# Patient Record
Sex: FEMALE | Race: WHITE | NOT HISPANIC OR LATINO | Employment: OTHER | URBAN - METROPOLITAN AREA
[De-identification: names, ages, dates, MRNs, and addresses within clinical notes are randomized per-mention and may not be internally consistent; named-entity substitution may affect disease eponyms.]

---

## 2017-02-17 ENCOUNTER — HOSPITAL ENCOUNTER (EMERGENCY)
Facility: HOSPITAL | Age: 67
Discharge: HOME | End: 2017-02-18
Attending: EMERGENCY MEDICINE | Admitting: EMERGENCY MEDICINE
Payer: COMMERCIAL

## 2017-02-17 ENCOUNTER — APPOINTMENT (EMERGENCY)
Dept: RADIOLOGY | Facility: HOSPITAL | Age: 67
End: 2017-02-17
Payer: COMMERCIAL

## 2017-02-17 DIAGNOSIS — R55 SYNCOPE: Primary | ICD-10-CM

## 2017-02-17 LAB
ANION GAP SERPL CALCULATED.3IONS-SCNC: 7 MMOL/L (ref 4–13)
BASOPHILS # BLD AUTO: 0.03 THOUSANDS/ΜL (ref 0–0.1)
BASOPHILS NFR BLD AUTO: 0 % (ref 0–1)
BUN SERPL-MCNC: 14 MG/DL (ref 5–25)
CALCIUM SERPL-MCNC: 8.7 MG/DL (ref 8.3–10.1)
CHLORIDE SERPL-SCNC: 107 MMOL/L (ref 100–108)
CO2 SERPL-SCNC: 27 MMOL/L (ref 21–32)
CREAT SERPL-MCNC: 0.73 MG/DL (ref 0.6–1.3)
EOSINOPHIL # BLD AUTO: 0.03 THOUSAND/ΜL (ref 0–0.61)
EOSINOPHIL NFR BLD AUTO: 0 % (ref 0–6)
ERYTHROCYTE [DISTWIDTH] IN BLOOD BY AUTOMATED COUNT: 12.9 % (ref 11.6–15.1)
GFR SERPL CREATININE-BSD FRML MDRD: >60 ML/MIN/1.73SQ M
GLUCOSE SERPL-MCNC: 128 MG/DL (ref 65–140)
HCT VFR BLD AUTO: 39.4 % (ref 34.8–46.1)
HGB BLD-MCNC: 13.5 G/DL (ref 11.5–15.4)
LYMPHOCYTES # BLD AUTO: 1.22 THOUSANDS/ΜL (ref 0.6–4.47)
LYMPHOCYTES NFR BLD AUTO: 16 % (ref 14–44)
MCH RBC QN AUTO: 32.1 PG (ref 26.8–34.3)
MCHC RBC AUTO-ENTMCNC: 34.3 G/DL (ref 31.4–37.4)
MCV RBC AUTO: 94 FL (ref 82–98)
MONOCYTES # BLD AUTO: 0.49 THOUSAND/ΜL (ref 0.17–1.22)
MONOCYTES NFR BLD AUTO: 6 % (ref 4–12)
NEUTROPHILS # BLD AUTO: 5.99 THOUSANDS/ΜL (ref 1.85–7.62)
NEUTS SEG NFR BLD AUTO: 78 % (ref 43–75)
NRBC BLD AUTO-RTO: 0 /100 WBCS
PLATELET # BLD AUTO: 208 THOUSANDS/UL (ref 149–390)
PMV BLD AUTO: 9.6 FL (ref 8.9–12.7)
POTASSIUM SERPL-SCNC: 3.8 MMOL/L (ref 3.5–5.3)
RBC # BLD AUTO: 4.2 MILLION/UL (ref 3.81–5.12)
SODIUM SERPL-SCNC: 141 MMOL/L (ref 136–145)
SPECIMEN SOURCE: NORMAL
TROPONIN I BLD-MCNC: 0.01 NG/ML (ref 0–0.08)
WBC # BLD AUTO: 7.78 THOUSAND/UL (ref 4.31–10.16)

## 2017-02-17 PROCEDURE — 85025 COMPLETE CBC W/AUTO DIFF WBC: CPT | Performed by: EMERGENCY MEDICINE

## 2017-02-17 PROCEDURE — 70450 CT HEAD/BRAIN W/O DYE: CPT

## 2017-02-17 PROCEDURE — 84484 ASSAY OF TROPONIN QUANT: CPT

## 2017-02-17 PROCEDURE — 36415 COLL VENOUS BLD VENIPUNCTURE: CPT | Performed by: EMERGENCY MEDICINE

## 2017-02-17 PROCEDURE — 80048 BASIC METABOLIC PNL TOTAL CA: CPT | Performed by: EMERGENCY MEDICINE

## 2017-02-17 PROCEDURE — 93005 ELECTROCARDIOGRAM TRACING: CPT

## 2017-02-17 PROCEDURE — 96360 HYDRATION IV INFUSION INIT: CPT

## 2017-02-17 RX ORDER — SERTRALINE HYDROCHLORIDE 25 MG/1
25 TABLET, FILM COATED ORAL
COMMUNITY

## 2017-02-17 RX ADMIN — SODIUM CHLORIDE 1000 ML: 0.9 INJECTION, SOLUTION INTRAVENOUS at 21:09

## 2017-02-18 VITALS
DIASTOLIC BLOOD PRESSURE: 54 MMHG | RESPIRATION RATE: 18 BRPM | SYSTOLIC BLOOD PRESSURE: 115 MMHG | OXYGEN SATURATION: 97 % | HEART RATE: 56 BPM | TEMPERATURE: 98 F | WEIGHT: 112 LBS

## 2017-02-18 LAB
ATRIAL RATE: 68 BPM
P AXIS: 74 DEGREES
PR INTERVAL: 224 MS
QRS AXIS: 85 DEGREES
QRSD INTERVAL: 88 MS
QT INTERVAL: 400 MS
QTC INTERVAL: 425 MS
SPECIMEN SOURCE: NORMAL
T WAVE AXIS: 49 DEGREES
TROPONIN I BLD-MCNC: 0 NG/ML (ref 0–0.08)
VENTRICULAR RATE: 68 BPM

## 2017-02-18 PROCEDURE — 99285 EMERGENCY DEPT VISIT HI MDM: CPT

## 2017-03-28 ENCOUNTER — ALLSCRIPTS OFFICE VISIT (OUTPATIENT)
Dept: OTHER | Facility: OTHER | Age: 67
End: 2017-03-28

## 2017-03-28 DIAGNOSIS — E78.5 HYPERLIPIDEMIA: ICD-10-CM

## 2017-05-02 ENCOUNTER — ALLSCRIPTS OFFICE VISIT (OUTPATIENT)
Dept: OTHER | Facility: OTHER | Age: 67
End: 2017-05-02

## 2017-05-22 ENCOUNTER — HOSPITAL ENCOUNTER (OUTPATIENT)
Dept: CT IMAGING | Facility: HOSPITAL | Age: 67
Discharge: HOME | End: 2017-05-22
Attending: INTERNAL MEDICINE
Payer: COMMERCIAL

## 2017-05-22 DIAGNOSIS — E78.5 HYPERLIPIDEMIA: ICD-10-CM

## 2017-05-25 ENCOUNTER — GENERIC CONVERSION - ENCOUNTER (OUTPATIENT)
Dept: OTHER | Facility: OTHER | Age: 67
End: 2017-05-25

## 2017-05-26 ENCOUNTER — ALLSCRIPTS OFFICE VISIT (OUTPATIENT)
Dept: OTHER | Facility: OTHER | Age: 67
End: 2017-05-26

## 2018-01-04 ENCOUNTER — ALLSCRIPTS OFFICE VISIT (OUTPATIENT)
Dept: OTHER | Facility: OTHER | Age: 68
End: 2018-01-04

## 2018-01-04 DIAGNOSIS — E78.5 HYPERLIPIDEMIA: ICD-10-CM

## 2018-01-04 DIAGNOSIS — Z11.59 ENCOUNTER FOR SCREENING FOR OTHER VIRAL DISEASES (CODE): ICD-10-CM

## 2018-01-13 VITALS
SYSTOLIC BLOOD PRESSURE: 92 MMHG | HEART RATE: 62 BPM | HEIGHT: 64 IN | DIASTOLIC BLOOD PRESSURE: 50 MMHG | WEIGHT: 112.19 LBS | BODY MASS INDEX: 19.15 KG/M2

## 2018-01-13 VITALS
HEART RATE: 64 BPM | BODY MASS INDEX: 19.12 KG/M2 | HEIGHT: 64 IN | DIASTOLIC BLOOD PRESSURE: 72 MMHG | WEIGHT: 112 LBS | SYSTOLIC BLOOD PRESSURE: 145 MMHG

## 2018-01-14 VITALS
SYSTOLIC BLOOD PRESSURE: 118 MMHG | WEIGHT: 113 LBS | HEIGHT: 64 IN | BODY MASS INDEX: 19.29 KG/M2 | HEART RATE: 61 BPM | DIASTOLIC BLOOD PRESSURE: 60 MMHG

## 2018-01-15 ENCOUNTER — GENERIC CONVERSION - ENCOUNTER (OUTPATIENT)
Dept: INTERNAL MEDICINE CLINIC | Facility: CLINIC | Age: 68
End: 2018-01-15

## 2018-01-22 VITALS
WEIGHT: 108 LBS | RESPIRATION RATE: 16 BRPM | OXYGEN SATURATION: 98 % | BODY MASS INDEX: 18.44 KG/M2 | DIASTOLIC BLOOD PRESSURE: 76 MMHG | HEIGHT: 64 IN | SYSTOLIC BLOOD PRESSURE: 110 MMHG | TEMPERATURE: 97.3 F | HEART RATE: 60 BPM

## 2018-01-22 VITALS
WEIGHT: 112 LBS | HEART RATE: 66 BPM | BODY MASS INDEX: 19.12 KG/M2 | HEIGHT: 64 IN | DIASTOLIC BLOOD PRESSURE: 68 MMHG | SYSTOLIC BLOOD PRESSURE: 110 MMHG

## 2018-01-23 NOTE — PROGRESS NOTES
Assessment    1  Encounter for preventive health examination (V70 0) (Z00 00)    Plan  Dyslipidemia    · (1) COMPREHENSIVE METABOLIC PANEL; Status:Active; Requested [id number];    · (1) LIPID PANEL FASTING W DIRECT LDL REFLEX; Status:Active; Requested  EBP:[id number];    · (1) TSH WITH FT4 REFLEX; Status:Active; Requested ADO:[id number];    · Follow-up visit in 6 months Evaluation and Treatment  Follow-up  Status: Hold For -  Scheduling  Requested for: 34SKJ3487  Health Maintenance    · Alcohol misuse can be a problem with advancing age ; Status:Complete;   Done:  84FFY7853   · Drink plenty of fluids ; Status:Complete;   Done: 59PLJ8480   · Eat a low fat and low cholesterol diet ; Status:Complete;   Done: 30GCN9146   · The plan of care for falls is detailed in the plan and/or discussion section of today's note ;  Status:Complete;   Done: 11KWJ4765   · There are many exercise options for seniors ; Status:Complete;   Done: 15QSL5914   · There ways to avoid falling ; Status:Complete;   Done: 90CHB7627   · These are things you can do to prevent falls in and around the home ; Status:Complete;    Done: 78GMB4616   · We recommend that you follow these steps to lower your risk of osteoporosis  ;  Status:Complete;   Done: 77FIH7899  Need for hepatitis C screening test    · (1) HEP C ANTIBODY; Status:Active; Requested [id number]:[id number];   Screening for genitourinary condition    · *VB - Urinary Incontinence Screen (Dx Z13 89 Screen for UI); Status:Active; Requested  JZV:[id number];   Screening for neurological condition    · *VB - Fall Risk Assessment  (Dx Z13 89 Screen for Neurologic Disorder); Status:Active; Requested ZJS:[id number]; Discussion/Summary    Fall screen - negative     screen- negative  Impression: Initial Annual Wellness Visit, with preventive exam as well as age and risk appropriate counseling completed       Cardiovascular screening and counseling: the risks and benefits of screening were discussed and screening is current  Diabetes screening and counseling: the risks and benefits of screening were discussed and due for blood glucose  Colorectal cancer screening and counseling: the risks and benefits of screening were discussed, screening is current and followed by Dr Sarah Sarkar  Breast cancer screening and counseling: the risks and benefits of screening were discussed and the patient declines screening  Cervical cancer screening and counseling: the risks and benefits of screening were discussed, screening is current and Followed by Dr Orion Ahumada  Osteoporosis screening and counseling: the risks and benefits of screening were discussed, screening is current and Followed by Dr Orion Ahumada  Abdominal aortic aneurysm screening and counseling: screening not indicated  Glaucoma screening and counseling: the risks and benefits of screening were discussed and ophthalmologist referral    HIV screening and counseling: screening not indicated  Hepatitis C Screening: the patient was counseled on Hepatitis C screening  The patient agrees to Hepatitis C screening  Immunizations: the risks and benefits of influenza vaccination were discussed with the patient, influenza vaccine is up to date this year, influenza vaccination is recommended annually, the risks and benefits of pneumococcal vaccination were discussed with the patient, the patient declines the pneumococcal vaccination, hepatitis B vaccination series is not indicated at this time due to the patient's low risk of bhumika the disease, Zostavax vaccination up to date, the risks and benefits of the Td vaccine were discussed with the patient, the patient declines the Td vaccine, the risks and benefits of the Tdap vaccine were discussed with the patient and the patient declines the Tdap vaccine  Advance Directive Planning: complete and up to date  Advice and education were given regarding alcohol use and fall risk reduction   She was referred to none today  Medical Equipment/Suppliers: none today  Patient Discussion: plan discussed with the patient, follow-up visit needed in 6 months, 55 minute visit, greater than half of the time was spent on counseling  The treatment plan was reviewed with the patient/guardian  The patient/guardian understands and agrees with the treatment plan      History of Present Illness  HPI: 74yo female here as a new patient  She had n/v/d over new years, last episode of diarrhea last night after eating some ice cream    Welcome to Medicare and Wellness Visits: The patient is being seen for the initial annual wellness visit  Medicare Screening and Risk Factors   Hospitalizations: no previous hospitalizations, she has been hospitalized 0 times and no hospitalizations over past 12 months  Once per lifetime medicare screening tests: not eligible  Medicare Screening Tests Risk Questions   Abdominal aortic aneurysm risk assessment: none indicated  Osteoporosis risk assessment: , female gender, over 48years of age and alcohol use  Drug and Alcohol Use: The patient has never smoked cigarettes  The patient reports frequent alcohol use, drinking 1 drinks per day and 1 shot of bourbon each night  Diet and Physical Activity: Current diet includes well balanced meals  She exercises daily  Exercise: walking, plays tennis and skiis  Mood Disorder and Cognitive Impairment Screening: PHQ-9 Depression Scale   Over the past 2 weeks, how often have you been bothered by the following problems? 1 ) Little interest or pleasure in doing things? Not at all    2 ) Feeling down, depressed or hopeless? Not at all    3 ) Trouble falling asleep or sleeping too much? Not at all    4 ) Feeling tired or having little energy? Not at all    5 ) Poor appetite or overeating? Not at all    6 ) Feeling bad about yourself, or that you are a failure, or have let yourself or your family down?  Not at all    7 ) Trouble concentrating on things, such as reading a newspaper or watching television? Not at all    8 ) Moving or speaking so slowly that other people could have noticed, or the opposite, moving or speaking faster than usual? Not at all    9 ) Thoughts that you would be off dead or of hurting yourself in some way? Not at all  TOTAL SCORE: 0    Functional Ability/Level of Safety: She denies hearing difficulties  The patient is currently able to do activities of daily living without limitations, able to do instrumental activities of daily living without limitations, able to participate in social activities without limitations and able to drive without limitations  Fall risk factors: The patient fell 1 times in the past 12 months  had syncope episode  Home safety risk factors:  no grab bars in the bathroom and lives with her , but no loose rugs and no household clutter  Advance Directives: Advance directives: living will, durable power of  for health care directives and advance directives  Co-Managers and Medical Equipment/Suppliers: See Patient Care Team   Preventive Quality Program 65 and Older: Falls Risk: The patient fell 1 times in the past 12 months  Patient Care Team    Care Team Member Role Specialty Office Number   Zay LINDSAY  Cardiology [phone] [address] [phone]   Tomy Bentley MD  Colon and Rectal Surgery [phone]     Review of Systems    Constitutional: recent weight loss  Respiratory: negative  Gastrointestinal: as noted in HPI  Genitourinary: negative  Neurological: negative  Active Problems    1   Dyslipidemia (272 4) (E78 5)    Past Medical History    · History of Cyst   · History of Epilepsy (345 90) (G40 909)   · History of Lyme disease (V12 09) (Z86 19)   · History of tear of meniscus of knee joint (V13 59) (U39 944)   · History of Pituitary microadenoma (227 3) (D35 2)   · History of Vasovagal syncope (780 2) (R55)    The active problems and past medical history were reviewed and updated today  Surgical History    The surgical history was reviewed and updated today  Family History  Mother    · Family history of breast cancer in female (V16 3) (Z80 2)  Father    · Family history of stroke (V17 1) (Z82 3)  Maternal Aunt    · Family history of malignant neoplasm of ovary (V16 41) (Z80 41)    The family history was reviewed and updated today  Social History    · Alcohol use (V49 89) (Z78 9)   ·    · Never a smoker   · Occupation   · , , educator  The social history was reviewed and is unchanged  Current Meds   1  Aspirin 81 MG Oral Tablet Delayed Release; TAKE 1 TABLET DAILY; Therapy: (Recorded:28Mar2017) to Recorded   2  Climara Pro 0 045-0 015 MG/DAY Transdermal Patch Weekly; Therapy: (Recorded:28Mar2017) to Recorded   3  [employer] Fish Oil CAPS; Therapy: (Recorded:02May2017) to Recorded   4  [employer] Vitamin D3 1000 UNIT CAPS; take 1 capsule daily; Therapy: (Recorded:28Mar2017) to Recorded   5  Zoloft 25 MG Oral Tablet; TAKE 1 TABLET DAILY; Therapy: (Recorded:28Mar2017) to Recorded    The medication list was reviewed and updated today  Allergies    1  Pravastatin Sodium TABS   2  Zetia TABS   3  Crestor    Immunizations   1    Influenza  Nov 2017    Zoster  2016     Vitals  Signs    Temperature: 97 3 F  Heart Rate: 60  Respiration: 16  Systolic: 525  Diastolic: 76  Height: 5 ft 4 in  Weight: 108 lb   BMI Calculated: 18 54  BSA Calculated: 1 51  O2 Saturation: 98    Physical Exam    Constitutional   General appearance: No acute distress, well appearing and well nourished  well developed  Head and Face   Head and face: Normal     Eyes   Conjunctiva and lids: No swelling, erythema or discharge  Ears, Nose, Mouth, and Throat   External inspection of ears and nose: Normal     Otoscopic examination: Tympanic membranes translucent with normal light reflex   Canals patent without erythema  Hearing: Normal     Nasal mucosa, septum, and turbinates: Normal without edema or erythema  Lips, teeth, and gums: Normal, good dentition  Oropharynx: Normal with no erythema, edema, exudate or lesions  Neck   Neck: Supple, symmetric, trachea midline, no masses  Thyroid: Normal, no thyromegaly  Pulmonary   Respiratory effort: No increased work of breathing or signs of respiratory distress  Auscultation of lungs: Clear to auscultation  Cardiovascular   Auscultation of heart: Normal rate and rhythm, normal S1 and S2, no murmurs  Carotid pulses: 2+ bilaterally  Pedal pulses: 2+ bilaterally  Examination of extremities for edema and/or varicosities: Normal     Chest pt refused  Abdomen   Abdomen: Non-tender, no masses  The abdomen was flat  Bowel sounds were normal  The abdomen was soft and nontender  The abdomen was normal to percussion  Lymphatic   Palpation of lymph nodes in neck: No lymphadenopathy  Musculoskeletal   Gait and station: Normal     Neurologic No focal deficit  Cortical function: Normal mental status  There is no cognitive impairment  The patient achieved a score of 30 / 30 on the MMSE  Level of consciousness: normal    Psychiatric   Orientation to person, place, and time: Normal     Mood and affect: Normal        Procedure    Procedure: Visual Acuity Test    Follow-up  n/a for AWV  The patient was referred to Opthomology        Signatures   Electronically signed by : Monica Bojorquez DO; Jan 4 2018 12:12PM EST                       (Author)

## 2018-06-22 LAB — HCV AB SER-ACNC: NEGATIVE

## 2018-06-26 ENCOUNTER — TELEPHONE (OUTPATIENT)
Dept: INTERNAL MEDICINE CLINIC | Facility: CLINIC | Age: 68
End: 2018-06-26

## 2018-06-26 NOTE — TELEPHONE ENCOUNTER
Pt wanted to inform you that she had gotten her labs done on 6/22 at [facility]  They are in the Care Everywhere tab

## 2018-08-14 ENCOUNTER — OFFICE VISIT (OUTPATIENT)
Dept: INTERNAL MEDICINE CLINIC | Facility: CLINIC | Age: 68
End: 2018-08-14
Payer: COMMERCIAL

## 2018-08-14 VITALS
HEART RATE: 55 BPM | RESPIRATION RATE: 16 BRPM | OXYGEN SATURATION: 98 % | SYSTOLIC BLOOD PRESSURE: 116 MMHG | WEIGHT: 106.6 LBS | TEMPERATURE: 97.1 F | HEIGHT: 64 IN | DIASTOLIC BLOOD PRESSURE: 80 MMHG | BODY MASS INDEX: 18.2 KG/M2

## 2018-08-14 DIAGNOSIS — N95.1 SYMPTOMATIC MENOPAUSAL OR FEMALE CLIMACTERIC STATES: ICD-10-CM

## 2018-08-14 DIAGNOSIS — E78.5 DYSLIPIDEMIA: Primary | ICD-10-CM

## 2018-08-14 PROBLEM — R55 VASOVAGAL SYNCOPE: Status: ACTIVE | Noted: 2017-03-28

## 2018-08-14 PROBLEM — F32.9 MDD (MAJOR DEPRESSIVE DISORDER): Status: RESOLVED | Noted: 2018-01-04 | Resolved: 2018-08-14

## 2018-08-14 PROBLEM — F32.9 MDD (MAJOR DEPRESSIVE DISORDER): Status: ACTIVE | Noted: 2018-01-04

## 2018-08-14 PROCEDURE — 99213 OFFICE O/P EST LOW 20 MIN: CPT | Performed by: INTERNAL MEDICINE

## 2018-08-14 PROCEDURE — 3008F BODY MASS INDEX DOCD: CPT | Performed by: INTERNAL MEDICINE

## 2018-08-14 PROCEDURE — 3725F SCREEN DEPRESSION PERFORMED: CPT | Performed by: INTERNAL MEDICINE

## 2018-08-14 RX ORDER — CHLORHEXIDINE/GLYCERIN/HE-CELL
JELLY (GRAM) TOPICAL DAILY
COMMUNITY

## 2018-08-14 NOTE — PROGRESS NOTES
Assessment/Plan:    Dyslipidemia  Improved through dietary modifications  Continue to monitor    Symptomatic menopausal or female climacteric states  Has been on chronic use of sertraline 25mg daily per GYN  1  Dyslipidemia     2  Symptomatic menopausal or female climacteric states         Subjective:      Patient ID: Portia Mc is a 78 y o  female  72yo female with dyslipidemia and MDD here follow up care  Hyperlipidemia   This is a chronic problem  The current episode started more than 1 year ago  The problem is controlled  Current antihyperlipidemic treatment includes diet change  The current treatment provides moderate improvement of lipids  She was started on sertraline 25mg after having PMS symptoms after treatment of microadenoma  She is now postmenopausal, gets hot flashes  The following portions of the patient's history were reviewed and updated as appropriate: allergies, current medications, past family history, past medical history, past social history, past surgical history and problem list     Current Outpatient Prescriptions:     aspirin 81 MG tablet, Take 1 tablet by mouth daily, Disp: , Rfl:     Cholecalciferol ([employer]S VITAMIN D3) 1000 units capsule, Take 1 capsule by mouth daily, Disp: , Rfl:     Omega-3 Fatty Acids ([employer] FISH OIL) 1000 MG CAPS, Take by mouth, Disp: , Rfl:     sertraline (ZOLOFT) 25 mg tablet, Take 25 mg by mouth daily, Disp: , Rfl:     Review of Systems   Constitutional: Positive for unexpected weight change  Respiratory: Negative  Cardiovascular: Negative  Gastrointestinal: Negative  Genitourinary: Negative  Neurological: Negative  Psychiatric/Behavioral: Negative            Objective:    /80 (BP Location: Left arm, Patient Position: Sitting)   Pulse 55   Temp (!) 97 1 °F (36 2 °C)   Resp 16   Ht 5' 4" (1 626 m)   Wt 48 4 kg (106 lb 9 6 oz)   SpO2 98%   BMI 18 30 kg/m²      Physical Exam   Constitutional: She is oriented to person, place, and time  She appears well-developed and well-nourished  No distress  HENT:   Mouth/Throat: Oropharynx is clear and moist    Neck: No thyromegaly present  Cardiovascular: Normal rate, regular rhythm and normal heart sounds  Pulmonary/Chest: Effort normal and breath sounds normal  No respiratory distress  She has no wheezes  Neurological: She is alert and oriented to person, place, and time  Psychiatric: She has a normal mood and affect  Vitals reviewed        6/22/18  Fasting glucose 98, BUN/cr 18/0 83  Hep C neg  Total cholesterol 190, triglycerides 44, HDL 77 and   TSH 2 01  PHQ-9 Depression Screening    PHQ-9:    Frequency of the following problems over the past two weeks:       Little interest or pleasure in doing things:  0 - not at all  Feeling down, depressed, or hopeless:  0 - not at all  PHQ-2 Score:  0

## 2018-09-19 RX ORDER — PRAVASTATIN SODIUM 20 MG
TABLET ORAL DAILY
COMMUNITY
End: 2018-09-26 | Stop reason: ALTCHOICE

## 2018-09-19 RX ORDER — ASPIRIN 81 MG/1
TABLET ORAL DAILY
COMMUNITY
End: 2018-10-16 | Stop reason: ALTCHOICE

## 2018-09-26 ENCOUNTER — OFFICE VISIT (OUTPATIENT)
Dept: CARDIOLOGY CLINIC | Facility: CLINIC | Age: 68
End: 2018-09-26
Payer: COMMERCIAL

## 2018-09-26 VITALS
OXYGEN SATURATION: 99 % | BODY MASS INDEX: 17.86 KG/M2 | WEIGHT: 107.2 LBS | SYSTOLIC BLOOD PRESSURE: 102 MMHG | HEART RATE: 75 BPM | HEIGHT: 65 IN | DIASTOLIC BLOOD PRESSURE: 56 MMHG

## 2018-09-26 DIAGNOSIS — E78.5 DYSLIPIDEMIA: Primary | ICD-10-CM

## 2018-09-26 PROCEDURE — 99214 OFFICE O/P EST MOD 30 MIN: CPT | Performed by: INTERNAL MEDICINE

## 2018-09-26 NOTE — PROGRESS NOTES
Cardiology   Alina Smith 80 y o  female MRN: [id number]        Impression:  1  Syncope - very suggestive of vasovagal  Has improved with hydration  2  Dyslipidemia - CT Calcium score of 42  But improved LDL on diet modifications  Recommendations:  1  Continue current medications  2  Follow up on an as needed basis  HPI: Alina Smith is a 80y o  year old female with likely vasovagal syncope  No further episodes with hydration  Is reluctant to start a statin, but cholesterol much improved with diet and exercise  No chest pain, shortness of breath, or palpitations  Review of Systems   Constitutional: Negative  HENT: Negative  Eyes: Negative  Respiratory: Negative for chest tightness and shortness of breath  Cardiovascular: Negative for chest pain, palpitations and leg swelling  Gastrointestinal: Negative  Endocrine: Negative  Genitourinary: Negative  Musculoskeletal: Negative  Skin: Negative  Allergic/Immunologic: Negative  Neurological: Negative  Hematological: Negative  Psychiatric/Behavioral: Negative  All other systems reviewed and are negative  Past Medical History:   Diagnosis Date    Epilepsy ([facility] )     Lyme disease     Microadenoma ([facility] )      No past surgical history on file  History   Alcohol Use    Yes     History   Drug Use No     History   Smoking Status    Never Smoker   Smokeless Tobacco    Never Used     Family History   Problem Relation Age of Onset    Stroke Father     Ovarian cancer Maternal Aunt        Allergies:   Allergies   Allergen Reactions    Other     Penicillins     Rosuvastatin     Ezetimibe Rash    Pravastatin Rash       Medications:     Current Outpatient Prescriptions:     aspirin (ECOTRIN LOW STRENGTH) 81 mg EC tablet, Daily, Disp: , Rfl:     Omega-3 Fatty Acids ([employer]S FISH OIL) 1000 MG CAPS, Take by mouth, Disp: , Rfl:     sertraline (ZOLOFT) 25 mg tablet, Take 25 mg by mouth daily, Disp: , Rfl:       Wt Readings from Last 3 Encounters:   09/26/18 48 6 kg (107 lb 3 2 oz)   08/14/18 48 4 kg (106 lb 9 6 oz)   01/04/18 49 kg (108 lb)     Temp Readings from Last 3 Encounters:   08/14/18 (!) 97 1 °F (36 2 °C)   01/04/18 (!) 97 3 °F (36 3 °C)   02/17/17 98 °F (36 7 °C) (Oral)     BP Readings from Last 3 Encounters:   09/26/18 102/56   08/14/18 116/80   01/04/18 110/76     Pulse Readings from Last 3 Encounters:   09/26/18 75   08/14/18 55   01/04/18 60         Physical Exam   Constitutional: She is oriented to person, place, and time  She appears well-developed  HENT:   Head: Atraumatic  Eyes: EOM are normal    Neck: Normal range of motion  Cardiovascular: Normal rate, regular rhythm and normal heart sounds  Exam reveals no gallop and no friction rub  No murmur heard  Pulmonary/Chest: Effort normal and breath sounds normal  No respiratory distress  She has no wheezes  She has no rales  Abdominal: Soft  Musculoskeletal: Normal range of motion  Neurological: She is alert and oriented to person, place, and time  Skin: Skin is warm and dry  Psychiatric: She has a normal mood and affect           Laboratory Studies:  CMP:  Lab Results   Component Value Date     02/17/2017    K 3 8 02/17/2017     02/17/2017    CO2 27 02/17/2017    BUN 14 02/17/2017    CREATININE 0 73 02/17/2017    EGFR >60 0 02/17/2017

## 2018-10-16 ENCOUNTER — APPOINTMENT (OUTPATIENT)
Dept: RADIOLOGY | Facility: OTHER | Age: 68
End: 2018-10-16
Payer: COMMERCIAL

## 2018-10-16 ENCOUNTER — OFFICE VISIT (OUTPATIENT)
Dept: OBGYN CLINIC | Facility: OTHER | Age: 68
End: 2018-10-16
Payer: COMMERCIAL

## 2018-10-16 VITALS
HEIGHT: 65 IN | SYSTOLIC BLOOD PRESSURE: 114 MMHG | WEIGHT: 110 LBS | HEART RATE: 56 BPM | BODY MASS INDEX: 18.33 KG/M2 | DIASTOLIC BLOOD PRESSURE: 74 MMHG

## 2018-10-16 DIAGNOSIS — S46.011A ROTATOR CUFF STRAIN, RIGHT, INITIAL ENCOUNTER: Primary | ICD-10-CM

## 2018-10-16 DIAGNOSIS — M25.511 ACUTE PAIN OF RIGHT SHOULDER: ICD-10-CM

## 2018-10-16 PROCEDURE — 73030 X-RAY EXAM OF SHOULDER: CPT

## 2018-10-16 PROCEDURE — 99203 OFFICE O/P NEW LOW 30 MIN: CPT | Performed by: ORTHOPAEDIC SURGERY

## 2018-10-16 RX ORDER — A/SINGAPORE/GP1908/2015 IVR-180 (H1N1) (AN A/MICHIGAN/45/2015 (H1N1)PDM09-LIKE VIRUS), A/HONG KONG/4801/2014, NYMC X-263B (H3N2) (AN A/HONG KONG/4801/2014-LIKE VIRUS), AND B/BRISBANE/60/2008, WILD TYPE (A B/BRISBANE/60/2008-LIKE VIRUS) 15; 15; 15 UG/.5ML; UG/.5ML; UG/.5ML
INJECTION, SUSPENSION INTRAMUSCULAR
Refills: 0 | COMMUNITY
Start: 2018-10-08 | End: 2018-12-10 | Stop reason: CLARIF

## 2018-10-16 NOTE — PROGRESS NOTES
Assessment  Diagnoses and all orders for this visit:    Acute RTC strain right shoulder      Discussion and Plan:  Patient exam today is consistent with a right shoulder RTC strain  Discussed treatment options with the patient today  Explained to the patient that the shoulder will continue to improve on it's own  If symptoms fail to improve we can consider injection at a later date  Recommend a course of PT  Continue ice as needed  IBU OTC prn pain   Activities as tolerated    Patient will follow up as needed      Subjective:   Patient ID: Milagro Nichols is a 80 y o  female      HPI  This a 80 yr old female here today for initial evaluation of right shoulder pain  Patient had prior open RTC repair by Dr Shari Berry in 2009 followed by 2 MUGA and surgery to remove scar tissue  Patient had been doing well until about 2 wks ago  Patient thinks she may have done something in her exercise class  Today she complains of anterior and lateral shoulder pain  Pain does not cross the elbow joint  Patient denies any N/T in the RUE  She has been icing the shoulder and taking  Patient states the right shoulder feels weak  Patient does not have pain with ROM of the right shoulder  The following portions of the patient's history were reviewed and updated as appropriate: allergies, current medications, past family history, past medical history, past social history, past surgical history and problem list     Review of Systems   Constitutional: Negative for chills and fever  HENT: Negative for drooling and sneezing  Eyes: Negative for redness  Respiratory: Negative for cough and wheezing  Gastrointestinal: Negative for nausea and vomiting  Psychiatric/Behavioral: Negative for behavioral problems  The patient is not nervous/anxious  Objective:  Right Shoulder Exam     Tenderness   The patient is experiencing tenderness in the [facility], [place] joint, anterolateral cuff insertion, anterior deltoid      Range of Motion   Normal right shoulder ROM    Muscle Strength   Normal right shoulder strength    Tests   Impingement:   Negative  Truong:          Negative  Cross Arm:      Negative  Drop Arm:        Negative          Physical Exam   Constitutional: She is oriented to person, place, and time  She appears well-developed and well-nourished  Eyes: Pupils are equal, round, and reactive to light  Pulmonary/Chest: Effort normal and breath sounds normal    Neurological: She is alert and oriented to person, place, and time  Skin: Skin is warm and dry  Psychiatric: She has a normal mood and affect  Her behavior is normal  Judgment and thought content normal          I have personally reviewed pertinent films in PACS and my interpretation is as follows  Right shoulder x-rays:  No fracture or dislocation,  Evidence of prior surgery  Scribe Attestation    I,:   Antonio Haddad am acting as a scribe while in the presence of the attending physician :        I,:   Ike Martinez MD personally performed the services described in this documentation    as scribed in my presence :             Claudene Livers MD, personally performed the services described in this documentation  All medical record entries made by the scribe were at my direction and in my presents  I have reviewed the chart and discharge instructions ( if applicable) and agree that the record reflects my personal performance and is accurate and complete Bro Valentin MD     Patient has a resolving rotator cuff strain and is functioning quite well, I did recommend she least visit the physical therapist to learn some exercises and help with local modalities to improve some of her symptoms   Overall structurally her shoulder seems quite sound and I do not feel further evaluation is warranted, if her symptoms worsen or she stops making progress I would be happy to re-evaluate her

## 2018-11-12 ENCOUNTER — OFFICE VISIT (OUTPATIENT)
Dept: INTERNAL MEDICINE CLINIC | Facility: CLINIC | Age: 68
End: 2018-11-12
Payer: COMMERCIAL

## 2018-11-12 VITALS
HEIGHT: 65 IN | OXYGEN SATURATION: 98 % | HEART RATE: 65 BPM | TEMPERATURE: 99.1 F | SYSTOLIC BLOOD PRESSURE: 115 MMHG | WEIGHT: 109.6 LBS | DIASTOLIC BLOOD PRESSURE: 68 MMHG | BODY MASS INDEX: 18.26 KG/M2

## 2018-11-12 DIAGNOSIS — L03.011 PARONYCHIA OF FINGER OF RIGHT HAND: ICD-10-CM
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
## 2018-11-12 DIAGNOSIS — M85.80 OSTEOPENIA, UNSPECIFIED LOCATION: Primary | ICD-10-CM

## 2018-11-12 PROBLEM — M85.89 OSTEOPENIA OF MULTIPLE SITES: Status: ACTIVE | Noted: 2018-11-12

## 2018-11-12 PROBLEM — R55 VASOVAGAL SYNCOPE: Status: RESOLVED | Noted: 2017-03-28 | Resolved: 2018-11-12

## 2018-11-12 PROBLEM — E78.5 DYSLIPIDEMIA: Status: RESOLVED | Noted: 2017-03-28 | Resolved: 2018-11-12

## 2018-11-12 PROBLEM — S46.011A ROTATOR CUFF STRAIN, RIGHT, INITIAL ENCOUNTER: Status: RESOLVED | Noted: 2018-10-16 | Resolved: 2018-11-12

## 2018-11-12 PROCEDURE — 3008F BODY MASS INDEX DOCD: CPT | Performed by: INTERNAL MEDICINE

## 2018-11-12 PROCEDURE — 1036F TOBACCO NON-USER: CPT | Performed by: INTERNAL MEDICINE

## 2018-11-12 PROCEDURE — 99214 OFFICE O/P EST MOD 30 MIN: CPT | Performed by: INTERNAL MEDICINE

## 2018-11-12 PROCEDURE — 1160F RVW MEDS BY RX/DR IN RCRD: CPT | Performed by: INTERNAL MEDICINE

## 2018-11-14 ENCOUNTER — TELEPHONE (OUTPATIENT)
Dept: INTERNAL MEDICINE CLINIC | Facility: CLINIC | Age: 68
End: 2018-11-14

## 2018-11-14 DIAGNOSIS — L03.011 PARONYCHIA OF RIGHT RING FINGER: Primary | ICD-10-CM

## 2018-11-14 NOTE — TELEPHONE ENCOUNTER
Patient was seen by you on Monday, 11/12/18  At her visit she discussed her infected finger  She called today and mentioned that you said if it wasn't any better, to let you know  She said she thinks it has gotten worse  She wanted to schedule an appointment with you on Friday, so I did schedule her  Are you ok with this? She didn't want to come out tomorrow due to the weather coming  Thanks

## 2018-11-14 NOTE — TELEPHONE ENCOUNTER
I called [facility] and they are able to get her in tomorrow morning at 8:40 with Dr Dallas Lafleur  I called [facility] Taylor and explained that you recommended for her to see the hand surgeon  She was agreeable and is willing to go

## 2018-11-14 NOTE — TELEPHONE ENCOUNTER
I think the next step is to see a hand surgeon about possibly exploring and draining the inflamed area (I mentioned this to [facility] as the next step)  Please set up an asap appointment with [facility] hand surgery (Amberly Lyles and Claudette Persaud) regarding right 4th finger paronychia with possible foreign body, with no response to 2 courses of oral antibiotics and mupirocin  Thanks

## 2018-11-30 ENCOUNTER — TELEPHONE (OUTPATIENT)
Dept: INTERNAL MEDICINE CLINIC | Facility: CLINIC | Age: 68
End: 2018-11-30

## 2018-11-30 NOTE — TELEPHONE ENCOUNTER
----- Message from Philip Samuels MD sent at 11/30/2018  2:18 PM EST -----  Please call Deann:  Her vitamin-D level is normal but is borderline low at 31    I would like her to take an extra 1000 units a day of vitamin D3

## 2018-12-10 ENCOUNTER — OFFICE VISIT (OUTPATIENT)
Dept: INTERNAL MEDICINE CLINIC | Facility: CLINIC | Age: 68
End: 2018-12-10
Payer: COMMERCIAL

## 2018-12-10 VITALS
WEIGHT: 109 LBS | DIASTOLIC BLOOD PRESSURE: 62 MMHG | TEMPERATURE: 98.5 F | HEIGHT: 65 IN | SYSTOLIC BLOOD PRESSURE: 115 MMHG | HEART RATE: 54 BPM | BODY MASS INDEX: 18.16 KG/M2 | OXYGEN SATURATION: 99 %

## 2018-12-10 DIAGNOSIS — K59.9 COLONIC DYSMOTILITY: Primary | ICD-10-CM

## 2018-12-10 PROCEDURE — 3008F BODY MASS INDEX DOCD: CPT | Performed by: INTERNAL MEDICINE

## 2018-12-10 PROCEDURE — 1036F TOBACCO NON-USER: CPT | Performed by: INTERNAL MEDICINE

## 2018-12-10 PROCEDURE — 1160F RVW MEDS BY RX/DR IN RCRD: CPT | Performed by: INTERNAL MEDICINE

## 2018-12-10 PROCEDURE — 4040F PNEUMOC VAC/ADMIN/RCVD: CPT | Performed by: INTERNAL MEDICINE

## 2018-12-10 PROCEDURE — 99213 OFFICE O/P EST LOW 20 MIN: CPT | Performed by: INTERNAL MEDICINE

## 2018-12-10 RX ORDER — MELATONIN
2000 DAILY
COMMUNITY

## 2018-12-10 NOTE — PROGRESS NOTES
Assessment/Plan   Problem List Items Addressed This Visit     None      Visit Diagnoses     Colonic dysmotility    -  Primary      Plan is a trial [place] yogurt with probiotics, with each meal for 7 days, and set a probiotic capsules which Evens Whalen would rather avoid  We specifically discussed to call if her discomfort becomes worse, if there is any fever, chills, loss of appetite, change in bowel habits  Subjective   Patient ID: Maxi Guerrero is a 80 y o  female, here today regarding low-grade left lower abdominal discomfort since she started antibiotics in mid November  Vitals:    12/10/18 1125   BP: 115/62   Pulse: (!) 54   Temp: 98 5 °F (36 9 °C)   SpO2: 99%     HPI   Deann reports that she has had a low grade, 1 to 2/10 intensity sharp pain in the left lower quadrant since she started antibiotics in mid November  She initially had some slight loose bowel movements but this resolved after stopping antibiotics  There has been no fever or chills, and there has been no passage of mucus or blood  Symptoms are gone which she wakes up in the morning and never wake her up at night  There is a slight increase in symptoms after eating, and the symptoms are resolved by moving her bowels once a day  There is no history of diverticulitis  There is no history of previous abdominal or gyn surgery  There is no anorexia, weight loss, no gyn or urinary symptoms on review  So far, Evens Whalen has not tried anything for her symptoms  The following portions of the patient's history were reviewed and updated as appropriate: allergies, current medications, past family history, past medical history, past social history, past surgical history and problem list     Review of Systems no melena or blood per rectum  Objective   Physical Exam   Vital signs stable, appears healthy and in no distress  Skin well hydrated    Abdomen:  Nondistended without surgical scars in the patient points to the left lower quadrant region as the area of her symptoms  Her estradiol patch is in place in the left lower quadrant region  The abdomen is without hernia or periumbilical adenopathy, nontender including in the left lower quadrant region and elsewhere, no guarding or rebound and no mass organomegaly are appreciated  Skin without pallor or icterus        Patient Active Problem List   Diagnosis    Symptomatic menopausal or female climacteric states    Osteopenia of multiple sites     Current Outpatient Prescriptions:     cholecalciferol (VITAMIN D3) 1,000 units tablet, Take 2,000 Units by mouth daily, Disp: , Rfl:     estradiol-norethindrone (COMBIPATCH) 0 05-0 14 MG/DAY, Place 1 patch on the skin 2 (two) times a week, Disp: , Rfl:     Omega-3 Fatty Acids ([employer] FISH OIL) 1000 MG CAPS, Take by mouth, Disp: , Rfl:     sertraline (ZOLOFT) 25 mg tablet, Take 25 mg by mouth daily, Disp: , Rfl:     mupirocin (BACTROBAN) 2 % ointment, Apply topically 2 (two) times a day (Patient not taking: Reported on 12/10/2018 ), Disp: 22 g, Rfl: 0

## 2019-03-04 ENCOUNTER — TELEPHONE (OUTPATIENT)
Dept: INTERNAL MEDICINE CLINIC | Facility: CLINIC | Age: 69
End: 2019-03-04

## 2019-03-04 ENCOUNTER — PATIENT MESSAGE (OUTPATIENT)
Dept: INTERNAL MEDICINE CLINIC | Facility: CLINIC | Age: 69
End: 2019-03-04

## 2019-03-04 DIAGNOSIS — K92.9 DIGESTIVE PROBLEMS: Primary | ICD-10-CM

## 2019-03-04 NOTE — TELEPHONE ENCOUNTER
Can patient take the probiotic twice daily?  Referral was sent to gastro for an appointment request

## 2019-03-04 NOTE — TELEPHONE ENCOUNTER
From: Alma Majano  To: Renaldo Celeste MD  Sent: 3/4/2019 12:01 PM EST  Subject: Referral Request    Hi Dr Kianna Sweeney here  As you may recall, I had some pain on the lower left side of abdomen  We decided it was a consequence of antibiotics  You suggested yogurt, but had to turn to a bottle of probiotics that made the pain somewhat better   at least I wasn't getting excruciating gas pain anymore  The pain recurs just before noon on a daily basis  Sometimes I gag  Yesterday I gagged about 15 times  My throat hurts! At any rate, I wonder if I should take two probiotic pills/day or see a GI doc  As an important aside, our son is an insatiable traveler and picked up a bug in either [place] or [place] ( unsure which country ) For a few years now he has had irritable bowel syndrome  IBS  He had been seeing a GI for three years or so  The GI just hit a wall and said he didn't know what else to do for him  My son, Matt Douglass (yes, with an "s"), had followed the FODMAPS diet meticulously  After visiting a nutritionist, she told him to try L glutamine  His IBS has improved dramatically  He is now able to reintroduce all the "verboten" foods gradually  Matt Douglass is a deliberate thinker and doesn't generally take risks  I am pleasantly surprised about the results fo L glutamine  My questions: Would you prefer I see a GI doc and if so, whom? Do YOU want to see me in the office and discuss? Should I try two probiotics a day? Currently I am taking Ultimate Mary Lou, 25 billion , 10 probiotic strains, distributed by [employer]  At the time of this email, it is just before noon  For the first time I have not eaten the multigrain bread from [employer] for breakfast and have no pain  First time  I did a bit of research and it seems that wheat is a big irritant to stomach after antibiotic damage  Finally, I would like to try L glutamine  But I would like to consider your opinion before I do that      I am more than happy to come to your office  I wasn't sure how to designate the heading of this email  I am not sure I need a referral, but I certainly will go to a specialist if you think I should  I do know that I will never take antibiotics again without simultaneously taking probiotics  But I always learn the hard way      Best-Deann Lopez

## 2019-03-05 ENCOUNTER — TELEPHONE (OUTPATIENT)
Dept: INTERNAL MEDICINE CLINIC | Facility: CLINIC | Age: 69
End: 2019-03-05

## 2019-03-05 NOTE — TELEPHONE ENCOUNTER
I spoke with [facility] and I did Call [facility]  I was able to get her in on 3/14/19 at 10:30 with Dr Caro Loyd  She is aware and thankful for this appt

## 2019-03-14 ENCOUNTER — CONSULT (OUTPATIENT)
Dept: GASTROENTEROLOGY | Facility: AMBULARY SURGERY CENTER | Age: 69
End: 2019-03-14
Payer: COMMERCIAL

## 2019-03-14 VITALS
BODY MASS INDEX: 18.03 KG/M2 | HEART RATE: 56 BPM | TEMPERATURE: 97.3 F | HEIGHT: 65 IN | SYSTOLIC BLOOD PRESSURE: 108 MMHG | RESPIRATION RATE: 18 BRPM | WEIGHT: 108.2 LBS | DIASTOLIC BLOOD PRESSURE: 60 MMHG

## 2019-03-14 DIAGNOSIS — K92.9 DIGESTIVE PROBLEMS: ICD-10-CM

## 2019-03-14 DIAGNOSIS — R10.32 LEFT LOWER QUADRANT PAIN: Primary | ICD-10-CM

## 2019-03-14 DIAGNOSIS — R14.0 BLOATING: ICD-10-CM

## 2019-03-14 PROCEDURE — 99204 OFFICE O/P NEW MOD 45 MIN: CPT | Performed by: INTERNAL MEDICINE

## 2019-03-14 NOTE — PATIENT INSTRUCTIONS
Patient is scheduled for an u/s on 3/22/19 at 830am at [facility]

## 2019-03-14 NOTE — LETTER
March 14, 2019     Taty Son MD  [id number]     Patient: Nathaniel Schuster   YOB: 1950   Date of Visit: 3/14/2019       Dear Dr Jenkins Sat: Thank you for referring Darylene Harsh to me for evaluation  Below are my notes for this consultation  If you have questions, please do not hesitate to call me  I look forward to following your patient along with you  Sincerely,        Moe Shields MD        CC: No Recipients  Moe Shields MD  3/14/2019  2:21 PM  Sign at close encounter  Consultation - [facility]  Nathaniel Schuster 80 y o  female MRN: [id number]  Unit/Bed#:  Encounter: [id number]        Consults    ASSESSMENT/PLAN:   1  Abdominal bloating/gas/left lower quadrant pain-suspect symptoms likely secondary to post infectious IBS versus less likely diverticulitis or SCAD  -discussed continue probiotic   -patient has tried gluten free diet with some improvement, has tried low FODMAP diet with some improvement and is currently doing Yeast free diet-which she states the seems to be helping the most   -discussed obtaining CT of abdomen and pelvis to evaluate for etiology of pain however patient is concerned about radiation and would prefer ultrasound 1st   -if ultrasound is unremarkable, would recommend colonoscopy for further evaluation  -increase water intake  Continue high-fiber diet           ______________________________________________________________________    Reason for Consult / Principal Problem: [unfilled]    HPI: Nathaniel Schuster is a 80y o  year old female with history of micro adenoma, Lyme disease, epilepsy, presents for evaluation of abdominal bloating and left lower quadrant abdominal discomfort  Patient states that she was prescribed antibiotics several months ago for toe infection, following which she has had increased abdominal bloating and gas    She states that she also has left lower quadrant pain that typically occurs 3 hours after bowel movement and often is relieved by eating  She has describes this as a cramping pain localized only to the left lower quadrant  She denies any trauma to the area, no rash  No change in bowel habits  Denies any hematochezia  Denies hematemesis, nausea or vomiting  Patient states that she has started taking probiotics and has noted significant improvement in abdominal bloating and gas the but continues to have persistent pain  She states that she has lost almost 5-6 lb over the past 1 year however this is intentional   She endorses having had a colonoscopy in 2016 which was notable for polyps, diverticulosis and internal hemorrhoids  Patient states that she was recommended a repeat at 3-5 years  Review of Systems: The remainder of the review of systems was negative except for the pertinent positives noted in HPI  Historical Information   Past Medical History:   Diagnosis Date    [facility] [address] [facility]     Lyme disease     [facility]aden[facility] ([facility] )      No past surgical history on file  Social History   Social History     Substance and Sexual Activity   Alcohol Use Yes     Social History     Substance and Sexual Activity   Drug Use No     Social History     Tobacco Use   Smoking Status Never Smoker   Smokeless Tobacco Never Used     Family History   Problem Relation Age of Onset    Stroke Father     Ovarian cancer Maternal Aunt        Meds/Allergies       (Not in a hospital admission)  No current facility-administered medications for this visit  Allergies   Allergen Reactions    Other     Rosuvastatin     Ezetimibe Rash    Pravastatin Rash       Objective     Blood pressure 108/60, pulse 56, temperature (!) 97 3 °F (36 3 °C), temperature source Tympanic, resp  rate 18, height 5' 4 5" (1 638 m), weight 49 1 kg (108 lb 3 2 oz)      [unfilled]    PHYSICAL EXAM     GEN: well nourished, well developed, no acute distress  HEENT: anicteric, MMM, no cervical or supraclavicular lymphadenopathy  CV: RRR, no m/r/g  CHEST: CTA b/l, no WRR  ABD: +BS, soft, NT/ND, no hepatosplenomegaly  EXT: no c/c/e  SKIN: no rashes,  NEURO: aaox3    Lab Results:   No visits with results within 1 Day(s) from this visit     Latest known visit with results is:   Admission on 02/17/2017, Discharged on 02/18/2017   Component Date Value    WBC 02/17/2017 7 78     RBC 02/17/2017 4 20     Hemoglobin 02/17/2017 13 5     Hematocrit 02/17/2017 39 4     MCV 02/17/2017 94     MCH 02/17/2017 32 1     MCHC 02/17/2017 34 3     RDW 02/17/2017 12 9     MPV 02/17/2017 9 6     Platelets 29/73/9481 208     nRBC 02/17/2017 0     Neutrophils Relative 02/17/2017 78*    Lymphocytes Relative 02/17/2017 16     Monocytes Relative 02/17/2017 6     Eosinophils Relative 02/17/2017 0     Basophils Relative 02/17/2017 0     Neutrophils Absolute 02/17/2017 5 99     Lymphocytes Absolute 02/17/2017 1 22     Monocytes Absolute 02/17/2017 0 49     Eosinophils Absolute 02/17/2017 0 03     Basophils Absolute 02/17/2017 0 03     Sodium 02/17/2017 141     Potassium 02/17/2017 3 8     Chloride 02/17/2017 107     CO2 02/17/2017 27     ANION GAP 02/17/2017 7     BUN 02/17/2017 14     Creatinine 02/17/2017 0 73     Glucose 02/17/2017 128     Calcium 02/17/2017 8 7     eGFR 02/17/2017 >60 0     POC Troponin I 02/17/2017 0 01     Specimen Type 02/17/2017 VENOUS     POC Troponin I 02/17/2017 0 00     Specimen Type 02/17/2017 VENOUS     Ventricular Rate 02/17/2017 68     Atrial Rate 02/17/2017 68     LA Interval 02/17/2017 224     QRSD Interval 02/17/2017 88     QT Interval 02/17/2017 400     QTC Interval 02/17/2017 425     P Axis 02/17/2017 74     QRS Axis 02/17/2017 85     T Wave Axis 02/17/2017 49      Imaging Studies: I have personally reviewed pertinent films in PACS

## 2019-03-14 NOTE — PROGRESS NOTES
Consultation - [facility]) [facility]  Army Pizano 80 y o  female MRN: [id number]  Unit/Bed#:  Encounter: [id number]        Consults    ASSESSMENT/PLAN:   1  Abdominal bloating/gas/left lower quadrant pain-suspect symptoms likely secondary to post infectious IBS versus less likely diverticulitis or SCAD  -discussed continue probiotic   -patient has tried gluten free diet with some improvement, has tried low FODMAP diet with some improvement and is currently doing Yeast free diet-which she states the seems to be helping the most   -discussed obtaining CT of abdomen and pelvis to evaluate for etiology of pain however patient is concerned about radiation and would prefer ultrasound 1st   -if ultrasound is unremarkable, would recommend colonoscopy for further evaluation  -increase water intake  Continue high-fiber diet           ______________________________________________________________________    Reason for Consult / Principal Problem: [unfilled]    HPI: Army Pizano is a 80y o  year old female with history of micro adenoma, Lyme disease, epilepsy, presents for evaluation of abdominal bloating and left lower quadrant abdominal discomfort  Patient states that she was prescribed antibiotics several months ago for toe infection, following which she has had increased abdominal bloating and gas  She states that she also has left lower quadrant pain that typically occurs 3 hours after bowel movement and often is relieved by eating  She has describes this as a cramping pain localized only to the left lower quadrant  She denies any trauma to the area, no rash  No change in bowel habits  Denies any hematochezia  Denies hematemesis, nausea or vomiting  Patient states that she has started taking probiotics and has noted significant improvement in abdominal bloating and gas the but continues to have persistent pain    She states that she has lost almost 5-6 lb over the past 1 year however this is intentional   She endorses having had a colonoscopy in 2016 which was notable for polyps, diverticulosis and internal hemorrhoids  Patient states that she was recommended a repeat at 3-5 years  Review of Systems: The remainder of the review of systems was negative except for the pertinent positives noted in HPI  Historical Information   Past Medical History:   Diagnosis Date    [facility] [address] [facility] )     Lyme disease     [facility]adenoma ([facility] )      No past surgical history on file  Social History   Social History     Substance and Sexual Activity   Alcohol Use Yes     Social History     Substance and Sexual Activity   Drug Use No     Social History     Tobacco Use   Smoking Status Never Smoker   Smokeless Tobacco Never Used     Family History   Problem Relation Age of Onset    Stroke Father     Ovarian cancer Maternal Aunt        Meds/Allergies       (Not in a hospital admission)  No current facility-administered medications for this visit  Allergies   Allergen Reactions    Other     Rosuvastatin     Ezetimibe Rash    Pravastatin Rash       Objective     Blood pressure 108/60, pulse 56, temperature (!) 97 3 °F (36 3 °C), temperature source Tympanic, resp  rate 18, height 5' 4 5" (1 638 m), weight 49 1 kg (108 lb 3 2 oz)  [unfilled]    PHYSICAL EXAM     GEN: well nourished, well developed, no acute distress  HEENT: anicteric, MMM, no cervical or supraclavicular lymphadenopathy  CV: RRR, no m/r/g  CHEST: CTA b/l, no WRR  ABD: +BS, soft, NT/ND, no hepatosplenomegaly  EXT: no c/c/e  SKIN: no rashes,  NEURO: aaox3    Lab Results:   No visits with results within 1 Day(s) from this visit     Latest known visit with results is:   Admission on 02/17/2017, Discharged on 02/18/2017   Component Date Value    WBC 02/17/2017 7 78     RBC 02/17/2017 4 20     Hemoglobin 02/17/2017 13 5     Hematocrit 02/17/2017 39 4     MCV 02/17/2017 94     MCH 02/17/2017 32 1     MCHC 02/17/2017 34 3     RDW 02/17/2017 12 9  MPV 02/17/2017 9 6     Platelets 88/59/4822 208     nRBC 02/17/2017 0     Neutrophils Relative 02/17/2017 78*    Lymphocytes Relative 02/17/2017 16     Monocytes Relative 02/17/2017 6     Eosinophils Relative 02/17/2017 0     Basophils Relative 02/17/2017 0     Neutrophils Absolute 02/17/2017 5 99     Lymphocytes Absolute 02/17/2017 1 22     Monocytes Absolute 02/17/2017 0 49     Eosinophils Absolute 02/17/2017 0 03     Basophils Absolute 02/17/2017 0 03     Sodium 02/17/2017 141     Potassium 02/17/2017 3 8     Chloride 02/17/2017 107     CO2 02/17/2017 27     ANION GAP 02/17/2017 7     BUN 02/17/2017 14     Creatinine 02/17/2017 0 73     Glucose 02/17/2017 128     Calcium 02/17/2017 8 7     eGFR 02/17/2017 >60 0     POC Troponin I 02/17/2017 0 01     Specimen Type 02/17/2017 VENOUS     POC Troponin I 02/17/2017 0 00     Specimen Type 02/17/2017 VENOUS     Ventricular Rate 02/17/2017 68     Atrial Rate 02/17/2017 68     OK Interval 02/17/2017 224     QRSD Interval 02/17/2017 88     QT Interval 02/17/2017 400     QTC Interval 02/17/2017 425     P Axis 02/17/2017 74     QRS Axis 02/17/2017 85     T Wave Axis 02/17/2017 49      Imaging Studies: I have personally reviewed pertinent films in PACS

## 2019-03-19 ENCOUNTER — TRANSCRIBE ORDERS (OUTPATIENT)
Dept: ADMISSIONS | Facility: HOSPITAL | Age: 69
End: 2019-03-19

## 2019-03-22 ENCOUNTER — TELEPHONE (OUTPATIENT)
Dept: GASTROENTEROLOGY | Facility: CLINIC | Age: 69
End: 2019-03-22

## 2019-03-22 ENCOUNTER — HOSPITAL ENCOUNTER (OUTPATIENT)
Dept: RADIOLOGY | Facility: HOSPITAL | Age: 69
Discharge: HOME | End: 2019-03-22
Attending: INTERNAL MEDICINE
Payer: COMMERCIAL

## 2019-03-22 DIAGNOSIS — K92.9 DIGESTIVE PROBLEMS: ICD-10-CM

## 2019-03-22 PROCEDURE — 76705 ECHO EXAM OF ABDOMEN: CPT

## 2019-03-22 NOTE — TELEPHONE ENCOUNTER
Attempted to call patient  Went to [place]  Left detailed message that we will touch base with her again on Monday  Advised that we do not have her US results as of now  Recommended a blank lo residue/lo fiber diet for now  Will determine CT versus colonoscopy after we have US results and discuss with patient

## 2019-03-22 NOTE — TELEPHONE ENCOUNTER
Regarding: FW: Visit Follow-Up Question  Contact: [phone]      ----- Message -----  From: Milagro Nichols  Sent: 3/22/2019   9:35 AM  To: [facility] [place]  Subject: Visit Follow-Up Question                         ----- Message from [address] [place] [address] [address], Generic sent at 3/22/2019  9:35 AM EDT -----    Hi -  I have been reading diametrically opposite advice online about what to do  Should I be eating a mild diet right now: yogurt, white bread, etc  OR should I take a pain killer (ibuprofen ) and a high fiber diet? What is Plan B? Nothing showed up on ultrasound  I think you and I established that this is diverticulosis  How do I mitigate this pain that I have had for 4 months? Diet? Medicine? I need a plan  How do I prevent this from turning into diveritulitis? Mild diet? Or return to my high fiber and put up with pain and hope that it doesn't turn into diverticulitis? I could use a plan  If you want me to have a Cat scan to confirm, I can do that  But it seems to me, either way I will have to have a plan going forward  And I don't  I left the office feeling good about our rapport, but I am unsettled about what I should be doing or not doing  Would like to hear from you before the weekend gets rolling!     Bg-Deann Lopez

## 2019-03-26 ENCOUNTER — TELEPHONE (OUTPATIENT)
Dept: GASTROENTEROLOGY | Facility: CLINIC | Age: 69
End: 2019-03-26

## 2019-03-26 ENCOUNTER — TELEPHONE (OUTPATIENT)
Dept: GASTROENTEROLOGY | Facility: AMBULARY SURGERY CENTER | Age: 69
End: 2019-03-26

## 2019-03-26 NOTE — TELEPHONE ENCOUNTER
I called patient and reached vm, left message to call back if she has further questions or needs clarification

## 2019-03-26 NOTE — TELEPHONE ENCOUNTER
Regarding: FW: Visit Follow-Up Question  Contact: [phone]  Can you please check on this patient tomorrow? I sent her an email this evening but in case she has any further questions      ----- Message -----  From: Erica Denton MA  Sent: 3/20/2019   7:23 AM  To: Calista Evans MD  Subject: FW: Visit Follow-Up Question                         ----- Message -----  From: Eliza Panchal  Sent: 3/19/2019   7:13 PM  To: [facility]  Subject: Visit Follow-Up Question                         ----- Message from [address] [place] [address] sent at 3/19/2019  7:13 PM EDT -----    Hi Dr Stella Vu-    Please let me know when you receive the results of ultrasound  I tend to agree with you:  the pain in my lower left side is diverticulosis  You mentioned a medicine that might cause constipation  I was wary  1)  Is it medicine for pain or will it expedite a cure? I am on my fourth month with this pain  If it will ameliorate the condition, I will take it; otherwise, I can tough it out with the pain  I don't want to mask the condition  2) I read a bit more about diverticulosis  I am on a diet now of mild food: rice, no peanut butter, white bread, etc   I don't want to stay on it long  I cannot afford to lose weight  Some websites suggested a liquid diet for a few days  I just can't afford to lose weight  I can't take the chance on a liquid diet  Thank you in advance  I told my daughter how pleased I was with you, and gave you the best write evaluation      Bg-Deann Lopez

## 2019-03-27 ENCOUNTER — TELEPHONE (OUTPATIENT)
Dept: GASTROENTEROLOGY | Facility: AMBULARY SURGERY CENTER | Age: 69
End: 2019-03-27

## 2019-03-27 NOTE — TELEPHONE ENCOUNTER
----- Message from Jhony Penaloza MD sent at 3/27/2019  3:15 PM EDT -----  I have discussed the results with the patient yesterday, no evidence of fluid collection, I have asked the patient that should she have worsening abdominal pain, she needs to call us so we can order CT of abdomen and pelvis has ultrasound is limited and does not necessarily exclude diverticulitis

## 2019-04-08 DIAGNOSIS — R10.32 LLQ PAIN: Primary | ICD-10-CM

## 2019-04-08 NOTE — TELEPHONE ENCOUNTER
Please advise  Spoke with PT  Re: what you said  Offered pt appt to be seen because now she stated that after her was she has some slight discomfort  Pt stated that when she communicated with to Dr Malissa Bond and she states that  she doesn't have to be seen for the next 4-5 years  Pt stated that " I really wish you guys can read my emails between the drs so I dont have to keep repeating myself " Pt suggested that she believes there is something "going on " as she discussed with her daughter and she wants to "cure" this  PT did state that maybe she should just order a colonoscopy  I am unsure how to jose elias this

## 2019-04-08 NOTE — TELEPHONE ENCOUNTER
Discussed with patient  She continues to have waxing and waning LLQ pain  She is agreeable to a CT scan at this point  I have ordered a CT scan with IV and PO contrast and labs for BUN/Cr  I provided patient with central scheduling number to call to schedule  She is aware that we would like to rule out inflammation/diverticulitis before proceeding with a colonoscopy       Please mail her the scripts for BUN/Cr and CT scan today

## 2019-04-08 NOTE — TELEPHONE ENCOUNTER
We can hold off for now  If patient has recurrence of pain, nausea/vomiting, fever/chills, etc she should call us right away and we can order a CT scan stat

## 2019-04-19 ENCOUNTER — TRANSCRIBE ORDERS (OUTPATIENT)
Dept: RADIOLOGY | Facility: HOSPITAL | Age: 69
End: 2019-04-19

## 2019-04-19 ENCOUNTER — APPOINTMENT (OUTPATIENT)
Dept: LAB | Facility: HOSPITAL | Age: 69
End: 2019-04-19
Payer: COMMERCIAL

## 2019-04-19 ENCOUNTER — TELEPHONE (OUTPATIENT)
Dept: GASTROENTEROLOGY | Facility: AMBULARY SURGERY CENTER | Age: 69
End: 2019-04-19

## 2019-04-19 DIAGNOSIS — M85.80 OSTEOPENIA, UNSPECIFIED LOCATION: ICD-10-CM

## 2019-04-19 DIAGNOSIS — R10.32 LLQ PAIN: ICD-10-CM

## 2019-04-19 LAB
25(OH)D3 SERPL-MCNC: 45.4 NG/ML (ref 30–100)
BUN SERPL-MCNC: 24 MG/DL (ref 5–25)
CREAT SERPL-MCNC: 0.85 MG/DL (ref 0.6–1.3)
GFR SERPL CREATININE-BSD FRML MDRD: 71 ML/MIN/1.73SQ M

## 2019-04-19 PROCEDURE — 82565 ASSAY OF CREATININE: CPT

## 2019-04-19 PROCEDURE — 82306 VITAMIN D 25 HYDROXY: CPT

## 2019-04-19 PROCEDURE — 36415 COLL VENOUS BLD VENIPUNCTURE: CPT

## 2019-04-19 PROCEDURE — 84520 ASSAY OF UREA NITROGEN: CPT

## 2019-04-22 ENCOUNTER — HOSPITAL ENCOUNTER (OUTPATIENT)
Dept: RADIOLOGY | Facility: HOSPITAL | Age: 69
Discharge: HOME | End: 2019-04-22
Payer: COMMERCIAL

## 2019-04-22 ENCOUNTER — TELEPHONE (OUTPATIENT)
Dept: INTERNAL MEDICINE CLINIC | Facility: CLINIC | Age: 69
End: 2019-04-22

## 2019-04-22 DIAGNOSIS — R10.32 LLQ PAIN: ICD-10-CM

## 2019-04-22 PROCEDURE — 74177 CT ABD & PELVIS W/CONTRAST: CPT

## 2019-04-22 RX ADMIN — IOHEXOL 85 ML: 350 INJECTION, SOLUTION INTRAVENOUS at 20:23

## 2019-04-29 ENCOUNTER — TELEPHONE (OUTPATIENT)
Dept: INTERNAL MEDICINE CLINIC | Facility: CLINIC | Age: 69
End: 2019-04-29

## 2019-04-29 ENCOUNTER — TELEPHONE (OUTPATIENT)
Dept: UROLOGY | Facility: AMBULATORY SURGERY CENTER | Age: 69
End: 2019-04-29

## 2019-04-29 ENCOUNTER — TELEPHONE (OUTPATIENT)
Dept: GASTROENTEROLOGY | Facility: AMBULARY SURGERY CENTER | Age: 69
End: 2019-04-29

## 2019-04-29 DIAGNOSIS — N20.0 KIDNEY STONES: Primary | ICD-10-CM

## 2019-04-30 DIAGNOSIS — R93.89 ABNORMAL CT SCAN: Primary | ICD-10-CM

## 2019-05-02 ENCOUNTER — TELEPHONE (OUTPATIENT)
Dept: INTERNAL MEDICINE CLINIC | Facility: CLINIC | Age: 69
End: 2019-05-02

## 2019-05-02 ENCOUNTER — OFFICE VISIT (OUTPATIENT)
Dept: UROLOGY | Facility: AMBULATORY SURGERY CENTER | Age: 69
End: 2019-05-02
Payer: COMMERCIAL

## 2019-05-02 VITALS
HEART RATE: 58 BPM | DIASTOLIC BLOOD PRESSURE: 70 MMHG | HEIGHT: 64 IN | BODY MASS INDEX: 18.44 KG/M2 | WEIGHT: 108 LBS | SYSTOLIC BLOOD PRESSURE: 122 MMHG

## 2019-05-02 DIAGNOSIS — N20.0 NEPHROLITHIASIS: Primary | ICD-10-CM

## 2019-05-02 DIAGNOSIS — N23 RENAL COLIC: Primary | ICD-10-CM

## 2019-05-02 PROCEDURE — 99204 OFFICE O/P NEW MOD 45 MIN: CPT | Performed by: NURSE PRACTITIONER

## 2019-05-02 RX ORDER — CEFAZOLIN SODIUM 1 G/50ML
1000 SOLUTION INTRAVENOUS ONCE
Status: CANCELLED | OUTPATIENT
Start: 2019-05-02 | End: 2019-05-02

## 2019-05-02 RX ORDER — TRAMADOL HYDROCHLORIDE 50 MG/1
50 TABLET ORAL EVERY 6 HOURS PRN
Qty: 18 TABLET | Refills: 0 | Status: SHIPPED | OUTPATIENT
Start: 2019-05-02 | End: 2019-05-06

## 2019-05-03 ENCOUNTER — HOSPITAL ENCOUNTER (OUTPATIENT)
Facility: AMBULARY SURGERY CENTER | Age: 69
Setting detail: OUTPATIENT SURGERY
End: 2019-05-03
Attending: UROLOGY | Admitting: UROLOGY
Payer: COMMERCIAL

## 2019-05-03 PROBLEM — N20.0 NEPHROLITHIASIS: Status: ACTIVE | Noted: 2019-05-03

## 2019-05-06 ENCOUNTER — TELEPHONE (OUTPATIENT)
Dept: UROLOGY | Facility: AMBULATORY SURGERY CENTER | Age: 69
End: 2019-05-06

## 2019-05-06 ENCOUNTER — ANESTHESIA EVENT (OUTPATIENT)
Dept: PERIOP | Facility: AMBULARY SURGERY CENTER | Age: 69
End: 2019-05-06
Payer: COMMERCIAL

## 2019-05-06 VITALS — HEIGHT: 65 IN | BODY MASS INDEX: 17.99 KG/M2 | WEIGHT: 108 LBS

## 2019-05-06 RX ORDER — ASPIRIN 81 MG/1
81 TABLET ORAL
COMMUNITY

## 2019-05-07 ENCOUNTER — APPOINTMENT (OUTPATIENT)
Dept: LAB | Facility: HOSPITAL | Age: 69
End: 2019-05-07
Attending: UROLOGY
Payer: COMMERCIAL

## 2019-05-07 ENCOUNTER — OFFICE VISIT (OUTPATIENT)
Dept: LAB | Facility: HOSPITAL | Age: 69
End: 2019-05-07
Attending: UROLOGY
Payer: COMMERCIAL

## 2019-05-07 ENCOUNTER — TELEPHONE (OUTPATIENT)
Dept: UROLOGY | Facility: AMBULATORY SURGERY CENTER | Age: 69
End: 2019-05-07

## 2019-05-07 ENCOUNTER — TELEPHONE (OUTPATIENT)
Dept: INTERNAL MEDICINE CLINIC | Facility: CLINIC | Age: 69
End: 2019-05-07

## 2019-05-07 DIAGNOSIS — N20.0 NEPHROLITHIASIS: ICD-10-CM

## 2019-05-07 PROCEDURE — 87086 URINE CULTURE/COLONY COUNT: CPT

## 2019-05-07 PROCEDURE — 93005 ELECTROCARDIOGRAM TRACING: CPT

## 2019-05-08 ENCOUNTER — TELEPHONE (OUTPATIENT)
Dept: INTERNAL MEDICINE CLINIC | Facility: CLINIC | Age: 69
End: 2019-05-08

## 2019-05-08 ENCOUNTER — TELEPHONE (OUTPATIENT)
Dept: UROLOGY | Facility: AMBULATORY SURGERY CENTER | Age: 69
End: 2019-05-08

## 2019-05-08 ENCOUNTER — ANESTHESIA (OUTPATIENT)
Dept: PERIOP | Facility: AMBULARY SURGERY CENTER | Age: 69
End: 2019-05-08
Payer: COMMERCIAL

## 2019-05-08 ENCOUNTER — HOSPITAL ENCOUNTER (OUTPATIENT)
Facility: AMBULARY SURGERY CENTER | Age: 69
Setting detail: OUTPATIENT SURGERY
Discharge: HOME | End: 2019-05-08
Attending: UROLOGY | Admitting: UROLOGY
Payer: COMMERCIAL

## 2019-05-08 ENCOUNTER — APPOINTMENT (OUTPATIENT)
Dept: RADIOLOGY | Facility: AMBULARY SURGERY CENTER | Age: 69
End: 2019-05-08
Payer: COMMERCIAL

## 2019-05-08 VITALS
BODY MASS INDEX: 18.27 KG/M2 | RESPIRATION RATE: 18 BRPM | OXYGEN SATURATION: 100 % | SYSTOLIC BLOOD PRESSURE: 154 MMHG | WEIGHT: 107 LBS | DIASTOLIC BLOOD PRESSURE: 65 MMHG | TEMPERATURE: 97.6 F | HEIGHT: 64 IN | HEART RATE: 56 BPM

## 2019-05-08 DIAGNOSIS — N20.0 NEPHROLITHIASIS: Primary | ICD-10-CM

## 2019-05-08 LAB
ATRIAL RATE: 49 BPM
BACTERIA UR CULT: NORMAL
P AXIS: 81 DEGREES
PR INTERVAL: 232 MS
QRS AXIS: 80 DEGREES
QRSD INTERVAL: 82 MS
QT INTERVAL: 438 MS
QTC INTERVAL: 395 MS
T WAVE AXIS: 66 DEGREES
VENTRICULAR RATE: 49 BPM

## 2019-05-08 PROCEDURE — 52332 CYSTOSCOPY AND TREATMENT: CPT | Performed by: UROLOGY

## 2019-05-08 PROCEDURE — 93010 ELECTROCARDIOGRAM REPORT: CPT | Performed by: INTERNAL MEDICINE

## 2019-05-08 PROCEDURE — C1769 GUIDE WIRE: HCPCS | Performed by: UROLOGY

## 2019-05-08 PROCEDURE — C2617 STENT, NON-COR, TEM W/O DEL: HCPCS | Performed by: UROLOGY

## 2019-05-08 PROCEDURE — 74420 UROGRAPHY RTRGR +-KUB: CPT

## 2019-05-08 DEVICE — STENT URETERAL 6 FR 26CM INLAY OPTIMA: Type: IMPLANTABLE DEVICE | Site: URETER | Status: FUNCTIONAL

## 2019-05-08 RX ORDER — LIDOCAINE HYDROCHLORIDE 10 MG/ML
INJECTION, SOLUTION INFILTRATION; PERINEURAL AS NEEDED
Status: DISCONTINUED | OUTPATIENT
Start: 2019-05-08 | End: 2019-05-08 | Stop reason: SURG

## 2019-05-08 RX ORDER — CEPHALEXIN 500 MG/1
500 CAPSULE ORAL 3 TIMES DAILY
Qty: 15 CAPSULE | Refills: 0 | Status: SHIPPED | OUTPATIENT
Start: 2019-05-08 | End: 2019-05-13

## 2019-05-08 RX ORDER — FENTANYL CITRATE 50 UG/ML
INJECTION, SOLUTION INTRAMUSCULAR; INTRAVENOUS AS NEEDED
Status: DISCONTINUED | OUTPATIENT
Start: 2019-05-08 | End: 2019-05-08 | Stop reason: SURG

## 2019-05-08 RX ORDER — DEXAMETHASONE SODIUM PHOSPHATE 10 MG/ML
INJECTION, SOLUTION INTRAMUSCULAR; INTRAVENOUS AS NEEDED
Status: DISCONTINUED | OUTPATIENT
Start: 2019-05-08 | End: 2019-05-08 | Stop reason: SURG

## 2019-05-08 RX ORDER — MAGNESIUM HYDROXIDE 1200 MG/15ML
LIQUID ORAL AS NEEDED
Status: DISCONTINUED | OUTPATIENT
Start: 2019-05-08 | End: 2019-05-08 | Stop reason: HOSPADM

## 2019-05-08 RX ORDER — ONDANSETRON 2 MG/ML
INJECTION INTRAMUSCULAR; INTRAVENOUS AS NEEDED
Status: DISCONTINUED | OUTPATIENT
Start: 2019-05-08 | End: 2019-05-08 | Stop reason: SURG

## 2019-05-08 RX ORDER — FENTANYL CITRATE/PF 50 MCG/ML
25 SYRINGE (ML) INJECTION
Status: DISCONTINUED | OUTPATIENT
Start: 2019-05-08 | End: 2019-05-08 | Stop reason: HOSPADM

## 2019-05-08 RX ORDER — GLYCOPYRROLATE 0.2 MG/ML
INJECTION INTRAMUSCULAR; INTRAVENOUS AS NEEDED
Status: DISCONTINUED | OUTPATIENT
Start: 2019-05-08 | End: 2019-05-08 | Stop reason: SURG

## 2019-05-08 RX ORDER — CEFAZOLIN SODIUM 1 G/50ML
1000 SOLUTION INTRAVENOUS ONCE
Status: COMPLETED | OUTPATIENT
Start: 2019-05-08 | End: 2019-05-08

## 2019-05-08 RX ORDER — PROPOFOL 10 MG/ML
INJECTION, EMULSION INTRAVENOUS AS NEEDED
Status: DISCONTINUED | OUTPATIENT
Start: 2019-05-08 | End: 2019-05-08 | Stop reason: SURG

## 2019-05-08 RX ORDER — MIDAZOLAM HYDROCHLORIDE 1 MG/ML
INJECTION INTRAMUSCULAR; INTRAVENOUS AS NEEDED
Status: DISCONTINUED | OUTPATIENT
Start: 2019-05-08 | End: 2019-05-08 | Stop reason: SURG

## 2019-05-08 RX ORDER — SODIUM CHLORIDE, SODIUM LACTATE, POTASSIUM CHLORIDE, CALCIUM CHLORIDE 600; 310; 30; 20 MG/100ML; MG/100ML; MG/100ML; MG/100ML
INJECTION, SOLUTION INTRAVENOUS CONTINUOUS PRN
Status: DISCONTINUED | OUTPATIENT
Start: 2019-05-08 | End: 2019-05-08 | Stop reason: SURG

## 2019-05-08 RX ORDER — ACETAMINOPHEN 650 MG/1
650 SUPPOSITORY RECTAL EVERY 4 HOURS PRN
COMMUNITY
End: 2019-05-22

## 2019-05-08 RX ORDER — HYDROCODONE BITARTRATE AND ACETAMINOPHEN 5; 325 MG/1; MG/1
1 TABLET ORAL EVERY 4 HOURS PRN
Qty: 5 TABLET | Refills: 0 | Status: SHIPPED | OUTPATIENT
Start: 2019-05-08 | End: 2019-05-10

## 2019-05-08 RX ORDER — ONDANSETRON HYDROCHLORIDE 8 MG/1
8 TABLET, FILM COATED ORAL EVERY 8 HOURS PRN
Qty: 15 TABLET | Refills: 0 | Status: SHIPPED | OUTPATIENT
Start: 2019-05-08 | End: 2019-05-22

## 2019-05-08 RX ORDER — ONDANSETRON 2 MG/ML
4 INJECTION INTRAMUSCULAR; INTRAVENOUS ONCE AS NEEDED
Status: DISCONTINUED | OUTPATIENT
Start: 2019-05-08 | End: 2019-05-08 | Stop reason: HOSPADM

## 2019-05-08 RX ADMIN — SODIUM CHLORIDE, SODIUM LACTATE, POTASSIUM CHLORIDE, AND CALCIUM CHLORIDE: .6; .31; .03; .02 INJECTION, SOLUTION INTRAVENOUS at 08:48

## 2019-05-08 RX ADMIN — GLYCOPYRROLATE 0.4 MG: 0.2 INJECTION, SOLUTION INTRAMUSCULAR; INTRAVENOUS at 09:22

## 2019-05-08 RX ADMIN — LIDOCAINE HYDROCHLORIDE ANHYDROUS 50 MG: 10 INJECTION, SOLUTION INFILTRATION at 09:18

## 2019-05-08 RX ADMIN — DEXAMETHASONE SODIUM PHOSPHATE 4 MG: 10 INJECTION, SOLUTION INTRAMUSCULAR; INTRAVENOUS at 09:20

## 2019-05-08 RX ADMIN — CEFAZOLIN SODIUM 1000 MG: 1 SOLUTION INTRAVENOUS at 09:20

## 2019-05-08 RX ADMIN — MIDAZOLAM HYDROCHLORIDE 1 MG: 1 INJECTION, SOLUTION INTRAMUSCULAR; INTRAVENOUS at 09:15

## 2019-05-08 RX ADMIN — ONDANSETRON 4 MG: 2 INJECTION INTRAMUSCULAR; INTRAVENOUS at 09:45

## 2019-05-08 RX ADMIN — PROPOFOL 150 MG: 10 INJECTION, EMULSION INTRAVENOUS at 09:18

## 2019-05-08 RX ADMIN — FENTANYL CITRATE 50 MCG: 50 INJECTION, SOLUTION INTRAMUSCULAR; INTRAVENOUS at 09:18

## 2019-05-09 ENCOUNTER — OFFICE VISIT (OUTPATIENT)
Dept: UROLOGY | Facility: CLINIC | Age: 69
End: 2019-05-09
Payer: COMMERCIAL

## 2019-05-09 VITALS — HEART RATE: 56 BPM | SYSTOLIC BLOOD PRESSURE: 120 MMHG | DIASTOLIC BLOOD PRESSURE: 80 MMHG

## 2019-05-09 DIAGNOSIS — R33.9 RETENTION OF URINE: Primary | ICD-10-CM

## 2019-05-09 DIAGNOSIS — N20.0 KIDNEY STONE ON LEFT SIDE: ICD-10-CM

## 2019-05-09 DIAGNOSIS — N23 RENAL COLIC: ICD-10-CM

## 2019-05-09 LAB — POST-VOID RESIDUAL VOLUME, ML POC: 23 ML

## 2019-05-09 PROCEDURE — 99214 OFFICE O/P EST MOD 30 MIN: CPT | Performed by: UROLOGY

## 2019-05-09 PROCEDURE — 51798 US URINE CAPACITY MEASURE: CPT | Performed by: UROLOGY

## 2019-05-09 RX ORDER — CEFAZOLIN SODIUM 1 G/50ML
1000 SOLUTION INTRAVENOUS ONCE
Status: CANCELLED | OUTPATIENT
Start: 2019-05-28 | End: 2019-05-09

## 2019-05-09 RX ORDER — TAMSULOSIN HYDROCHLORIDE 0.4 MG/1
0.4 CAPSULE ORAL
Qty: 30 CAPSULE | Refills: 1 | Status: SHIPPED | OUTPATIENT
Start: 2019-05-09 | End: 2019-05-22

## 2019-05-12 ENCOUNTER — TELEPHONE (OUTPATIENT)
Dept: OTHER | Facility: OTHER | Age: 69
End: 2019-05-12

## 2019-05-13 ENCOUNTER — OFFICE VISIT (OUTPATIENT)
Dept: INTERNAL MEDICINE CLINIC | Facility: CLINIC | Age: 69
End: 2019-05-13
Payer: COMMERCIAL

## 2019-05-13 VITALS
OXYGEN SATURATION: 98 % | BODY MASS INDEX: 18.37 KG/M2 | DIASTOLIC BLOOD PRESSURE: 70 MMHG | TEMPERATURE: 98.3 F | HEART RATE: 58 BPM | WEIGHT: 107.6 LBS | SYSTOLIC BLOOD PRESSURE: 118 MMHG | HEIGHT: 64 IN

## 2019-05-13 DIAGNOSIS — N20.0 NEPHROLITHIASIS: ICD-10-CM

## 2019-05-13 DIAGNOSIS — K59.00 CONSTIPATION, UNSPECIFIED CONSTIPATION TYPE: Primary | ICD-10-CM

## 2019-05-13 PROCEDURE — 99214 OFFICE O/P EST MOD 30 MIN: CPT | Performed by: INTERNAL MEDICINE

## 2019-05-13 RX ORDER — MAGNESIUM CARB/ALUMINUM HYDROX 105-160MG
TABLET,CHEWABLE ORAL
Qty: 296 ML | Refills: 0 | Status: SHIPPED | OUTPATIENT
Start: 2019-05-13 | End: 2019-05-22

## 2019-05-14 ENCOUNTER — TELEPHONE (OUTPATIENT)
Dept: INTERNAL MEDICINE CLINIC | Facility: CLINIC | Age: 69
End: 2019-05-14

## 2019-05-24 ENCOUNTER — HOSPITAL ENCOUNTER (OUTPATIENT)
Dept: RADIOLOGY | Facility: HOSPITAL | Age: 69
Discharge: HOME | End: 2019-05-24
Payer: COMMERCIAL

## 2019-05-24 ENCOUNTER — ANESTHESIA EVENT (OUTPATIENT)
Dept: PERIOP | Facility: HOSPITAL | Age: 69
End: 2019-05-24
Payer: COMMERCIAL

## 2019-05-24 DIAGNOSIS — R93.89 ABNORMAL CT SCAN: ICD-10-CM

## 2019-05-24 PROCEDURE — 72148 MRI LUMBAR SPINE W/O DYE: CPT

## 2019-05-28 ENCOUNTER — TELEPHONE (OUTPATIENT)
Dept: OTHER | Facility: HOSPITAL | Age: 69
End: 2019-05-28

## 2019-05-28 ENCOUNTER — ANESTHESIA (OUTPATIENT)
Dept: PERIOP | Facility: HOSPITAL | Age: 69
End: 2019-05-28
Payer: COMMERCIAL

## 2019-05-28 ENCOUNTER — TELEPHONE (OUTPATIENT)
Dept: INTERNAL MEDICINE CLINIC | Facility: CLINIC | Age: 69
End: 2019-05-28

## 2019-05-28 ENCOUNTER — HOSPITAL ENCOUNTER (OUTPATIENT)
Facility: HOSPITAL | Age: 69
Setting detail: OUTPATIENT SURGERY
Discharge: HOME | End: 2019-05-28
Attending: UROLOGY | Admitting: UROLOGY
Payer: COMMERCIAL

## 2019-05-28 ENCOUNTER — APPOINTMENT (OUTPATIENT)
Dept: RADIOLOGY | Facility: HOSPITAL | Age: 69
End: 2019-05-28
Payer: COMMERCIAL

## 2019-05-28 VITALS
SYSTOLIC BLOOD PRESSURE: 123 MMHG | HEART RATE: 57 BPM | TEMPERATURE: 97.9 F | WEIGHT: 109 LBS | RESPIRATION RATE: 16 BRPM | DIASTOLIC BLOOD PRESSURE: 57 MMHG | BODY MASS INDEX: 18.61 KG/M2 | OXYGEN SATURATION: 98 % | HEIGHT: 64 IN

## 2019-05-28 DIAGNOSIS — N20.0 NEPHROLITHIASIS: Primary | ICD-10-CM

## 2019-05-28 DIAGNOSIS — N20.0 KIDNEY STONE: ICD-10-CM

## 2019-05-28 DIAGNOSIS — N20.0 KIDNEY STONE ON LEFT SIDE: Primary | ICD-10-CM

## 2019-05-28 PROCEDURE — 52356 CYSTO/URETERO W/LITHOTRIPSY: CPT | Performed by: UROLOGY

## 2019-05-28 PROCEDURE — 82360 CALCULUS ASSAY QUANT: CPT | Performed by: UROLOGY

## 2019-05-28 PROCEDURE — 74420 UROGRAPHY RTRGR +-KUB: CPT

## 2019-05-28 PROCEDURE — C1769 GUIDE WIRE: HCPCS | Performed by: UROLOGY

## 2019-05-28 PROCEDURE — C2617 STENT, NON-COR, TEM W/O DEL: HCPCS | Performed by: UROLOGY

## 2019-05-28 DEVICE — INLAY OPTIMA URETERAL STENT W/O GUIDEWIRE
Type: IMPLANTABLE DEVICE | Site: URETER | Status: FUNCTIONAL
Brand: BARD® INLAY OPTIMA® URETERAL STENT

## 2019-05-28 RX ORDER — ONDANSETRON 2 MG/ML
INJECTION INTRAMUSCULAR; INTRAVENOUS AS NEEDED
Status: DISCONTINUED | OUTPATIENT
Start: 2019-05-28 | End: 2019-05-28 | Stop reason: SURG

## 2019-05-28 RX ORDER — PROPOFOL 10 MG/ML
INJECTION, EMULSION INTRAVENOUS AS NEEDED
Status: DISCONTINUED | OUTPATIENT
Start: 2019-05-28 | End: 2019-05-28 | Stop reason: SURG

## 2019-05-28 RX ORDER — HYDROCODONE BITARTRATE AND ACETAMINOPHEN 5; 325 MG/1; MG/1
1 TABLET ORAL EVERY 4 HOURS PRN
Qty: 5 TABLET | Refills: 0 | Status: SHIPPED | OUTPATIENT
Start: 2019-05-28 | End: 2019-05-30

## 2019-05-28 RX ORDER — MIDAZOLAM HYDROCHLORIDE 1 MG/ML
INJECTION INTRAMUSCULAR; INTRAVENOUS AS NEEDED
Status: DISCONTINUED | OUTPATIENT
Start: 2019-05-28 | End: 2019-05-28 | Stop reason: SURG

## 2019-05-28 RX ORDER — CEFAZOLIN SODIUM 1 G/50ML
1000 SOLUTION INTRAVENOUS ONCE
Status: COMPLETED | OUTPATIENT
Start: 2019-05-28 | End: 2019-05-28

## 2019-05-28 RX ORDER — FENTANYL CITRATE 50 UG/ML
INJECTION, SOLUTION INTRAMUSCULAR; INTRAVENOUS AS NEEDED
Status: DISCONTINUED | OUTPATIENT
Start: 2019-05-28 | End: 2019-05-28 | Stop reason: SURG

## 2019-05-28 RX ORDER — MAGNESIUM HYDROXIDE 1200 MG/15ML
LIQUID ORAL AS NEEDED
Status: DISCONTINUED | OUTPATIENT
Start: 2019-05-28 | End: 2019-05-28 | Stop reason: HOSPADM

## 2019-05-28 RX ORDER — ONDANSETRON 2 MG/ML
4 INJECTION INTRAMUSCULAR; INTRAVENOUS EVERY 6 HOURS PRN
Status: DISCONTINUED | OUTPATIENT
Start: 2019-05-28 | End: 2019-05-28 | Stop reason: HOSPADM

## 2019-05-28 RX ORDER — KETOROLAC TROMETHAMINE 30 MG/ML
INJECTION, SOLUTION INTRAMUSCULAR; INTRAVENOUS AS NEEDED
Status: DISCONTINUED | OUTPATIENT
Start: 2019-05-28 | End: 2019-05-28 | Stop reason: SURG

## 2019-05-28 RX ORDER — ROCURONIUM BROMIDE 10 MG/ML
INJECTION, SOLUTION INTRAVENOUS AS NEEDED
Status: DISCONTINUED | OUTPATIENT
Start: 2019-05-28 | End: 2019-05-28 | Stop reason: SURG

## 2019-05-28 RX ORDER — DEXAMETHASONE SODIUM PHOSPHATE 4 MG/ML
INJECTION, SOLUTION INTRA-ARTICULAR; INTRALESIONAL; INTRAMUSCULAR; INTRAVENOUS; SOFT TISSUE AS NEEDED
Status: DISCONTINUED | OUTPATIENT
Start: 2019-05-28 | End: 2019-05-28 | Stop reason: SURG

## 2019-05-28 RX ORDER — PROMETHAZINE HYDROCHLORIDE 25 MG/ML
6.25 INJECTION, SOLUTION INTRAMUSCULAR; INTRAVENOUS ONCE
Status: CANCELLED | OUTPATIENT
Start: 2019-05-28

## 2019-05-28 RX ORDER — PROMETHAZINE HYDROCHLORIDE 25 MG/ML
6.25 INJECTION, SOLUTION INTRAMUSCULAR; INTRAVENOUS ONCE
Status: COMPLETED | OUTPATIENT
Start: 2019-05-28 | End: 2019-05-28

## 2019-05-28 RX ORDER — SODIUM CHLORIDE 9 MG/ML
125 INJECTION, SOLUTION INTRAVENOUS CONTINUOUS
Status: DISCONTINUED | OUTPATIENT
Start: 2019-05-28 | End: 2019-05-28 | Stop reason: HOSPADM

## 2019-05-28 RX ORDER — EPHEDRINE SULFATE 50 MG/ML
INJECTION INTRAVENOUS AS NEEDED
Status: DISCONTINUED | OUTPATIENT
Start: 2019-05-28 | End: 2019-05-28 | Stop reason: SURG

## 2019-05-28 RX ORDER — GLYCOPYRROLATE 0.2 MG/ML
INJECTION INTRAMUSCULAR; INTRAVENOUS AS NEEDED
Status: DISCONTINUED | OUTPATIENT
Start: 2019-05-28 | End: 2019-05-28 | Stop reason: SURG

## 2019-05-28 RX ORDER — CEPHALEXIN 500 MG/1
500 CAPSULE ORAL EVERY 12 HOURS SCHEDULED
Qty: 10 CAPSULE | Refills: 0 | Status: SHIPPED | OUTPATIENT
Start: 2019-05-28 | End: 2019-05-29

## 2019-05-28 RX ORDER — FENTANYL CITRATE 50 UG/ML
25 INJECTION, SOLUTION INTRAMUSCULAR; INTRAVENOUS
Status: DISCONTINUED | OUTPATIENT
Start: 2019-05-28 | End: 2019-05-28 | Stop reason: HOSPADM

## 2019-05-28 RX ORDER — NEOSTIGMINE METHYLSULFATE 1 MG/ML
INJECTION INTRAVENOUS AS NEEDED
Status: DISCONTINUED | OUTPATIENT
Start: 2019-05-28 | End: 2019-05-28 | Stop reason: SURG

## 2019-05-28 RX ADMIN — SODIUM CHLORIDE 125 ML/HR: 0.9 INJECTION, SOLUTION INTRAVENOUS at 06:30

## 2019-05-28 RX ADMIN — NEOSTIGMINE METHYLSULFATE 3 MG: 1 INJECTION, SOLUTION INTRAVENOUS at 08:50

## 2019-05-28 RX ADMIN — ONDANSETRON HYDROCHLORIDE 4 MG: 2 INJECTION, SOLUTION INTRAVENOUS at 07:35

## 2019-05-28 RX ADMIN — LIDOCAINE HYDROCHLORIDE 60 MG: 20 INJECTION, SOLUTION INTRAVENOUS at 07:35

## 2019-05-28 RX ADMIN — SODIUM CHLORIDE: 0.9 INJECTION, SOLUTION INTRAVENOUS at 08:13

## 2019-05-28 RX ADMIN — GLYCOPYRROLATE 0.6 MG: 0.2 INJECTION INTRAMUSCULAR; INTRAVENOUS at 08:50

## 2019-05-28 RX ADMIN — DEXAMETHASONE SODIUM PHOSPHATE 4 MG: 4 INJECTION, SOLUTION INTRAMUSCULAR; INTRAVENOUS at 07:35

## 2019-05-28 RX ADMIN — FENTANYL CITRATE 100 MCG: 50 INJECTION, SOLUTION INTRAMUSCULAR; INTRAVENOUS at 07:35

## 2019-05-28 RX ADMIN — ROCURONIUM BROMIDE 30 MG: 10 INJECTION, SOLUTION INTRAVENOUS at 07:35

## 2019-05-28 RX ADMIN — MIDAZOLAM 1 MG: 1 INJECTION INTRAMUSCULAR; INTRAVENOUS at 07:30

## 2019-05-28 RX ADMIN — EPHEDRINE SULFATE 5 MG: 50 INJECTION, SOLUTION INTRAVENOUS at 07:40

## 2019-05-28 RX ADMIN — PROMETHAZINE HYDROCHLORIDE 6.25 MG: 25 INJECTION INTRAMUSCULAR; INTRAVENOUS at 07:35

## 2019-05-28 RX ADMIN — PROPOFOL 150 MG: 10 INJECTION, EMULSION INTRAVENOUS at 07:35

## 2019-05-28 RX ADMIN — CEFAZOLIN SODIUM 1000 MG: 1 SOLUTION INTRAVENOUS at 07:39

## 2019-05-28 RX ADMIN — KETOROLAC TROMETHAMINE 15 MG: 30 INJECTION, SOLUTION INTRAMUSCULAR at 08:54

## 2019-05-29 ENCOUNTER — TELEPHONE (OUTPATIENT)
Dept: UROLOGY | Facility: CLINIC | Age: 69
End: 2019-05-29

## 2019-05-29 RX ORDER — NITROFURANTOIN 25; 75 MG/1; MG/1
100 CAPSULE ORAL 2 TIMES DAILY
Qty: 10 CAPSULE | Refills: 0 | Status: SHIPPED | OUTPATIENT
Start: 2019-05-29 | End: 2019-06-03

## 2019-05-31 ENCOUNTER — OFFICE VISIT (OUTPATIENT)
Dept: UROLOGY | Facility: AMBULATORY SURGERY CENTER | Age: 69
End: 2019-05-31
Payer: COMMERCIAL

## 2019-05-31 VITALS
SYSTOLIC BLOOD PRESSURE: 108 MMHG | WEIGHT: 106.25 LBS | HEIGHT: 64 IN | BODY MASS INDEX: 18.14 KG/M2 | HEART RATE: 64 BPM | DIASTOLIC BLOOD PRESSURE: 76 MMHG

## 2019-05-31 DIAGNOSIS — N20.0 NEPHROLITHIASIS: Primary | ICD-10-CM

## 2019-05-31 DIAGNOSIS — N23 RENAL COLIC: ICD-10-CM

## 2019-05-31 PROCEDURE — 99214 OFFICE O/P EST MOD 30 MIN: CPT | Performed by: UROLOGY

## 2019-06-03 LAB
CA PHOS MFR STONE: 10 %
CALCIUM OXALATE DIHYDRATE MFR STONE IR: 15 %
COLOR STONE: NORMAL
COM MFR STONE: 75 %
COMMENT-STONE3: NORMAL
COMPOSITION: NORMAL
LABORATORY COMMENT REPORT: NORMAL
NIDUS STONE QL: NORMAL
PHOTO: NORMAL
SIZE STONE: NORMAL MM
STONE ANALYSIS-IMP: NORMAL
STONE ANALYSIS-IMP: NORMAL
WT STONE: 10.8 MG

## 2019-06-11 ENCOUNTER — TELEPHONE (OUTPATIENT)
Dept: UROLOGY | Facility: CLINIC | Age: 69
End: 2019-06-11

## 2019-06-14 ENCOUNTER — HOSPITAL ENCOUNTER (OUTPATIENT)
Dept: RADIOLOGY | Facility: HOSPITAL | Age: 69
Discharge: HOME | End: 2019-06-14
Attending: UROLOGY
Payer: COMMERCIAL

## 2019-06-14 ENCOUNTER — TRANSCRIBE ORDERS (OUTPATIENT)
Dept: RADIOLOGY | Facility: HOSPITAL | Age: 69
End: 2019-06-14

## 2019-06-14 DIAGNOSIS — N20.0 NEPHROLITHIASIS: ICD-10-CM

## 2019-06-14 PROCEDURE — 76770 US EXAM ABDO BACK WALL COMP: CPT

## 2019-06-14 PROCEDURE — 74018 RADEX ABDOMEN 1 VIEW: CPT

## 2019-06-17 ENCOUNTER — OFFICE VISIT (OUTPATIENT)
Dept: UROLOGY | Facility: AMBULATORY SURGERY CENTER | Age: 69
End: 2019-06-17
Payer: COMMERCIAL

## 2019-06-17 VITALS
WEIGHT: 109 LBS | HEART RATE: 51 BPM | DIASTOLIC BLOOD PRESSURE: 60 MMHG | BODY MASS INDEX: 18.61 KG/M2 | SYSTOLIC BLOOD PRESSURE: 110 MMHG | HEIGHT: 64 IN

## 2019-06-17 DIAGNOSIS — N20.0 RENAL CALCULUS, RIGHT: Primary | ICD-10-CM

## 2019-06-17 PROCEDURE — 99214 OFFICE O/P EST MOD 30 MIN: CPT | Performed by: UROLOGY

## 2019-06-17 RX ORDER — CIPROFLOXACIN 2 MG/ML
400 INJECTION, SOLUTION INTRAVENOUS ONCE
Status: CANCELLED | OUTPATIENT
Start: 2019-06-17 | End: 2019-06-17

## 2019-06-17 NOTE — H&P (VIEW-ONLY)
6/17/2019    Mari Garcia  1950  [id number]        Assessment  Right UPJ calculus    Plan  We discussed finding on KUB and ultrasound  We reviewed the images together  I offered to options, the first is another ureteroscopy with laser, and the second is extracorporeal shockwave lithotripsy  She would like to proceed with the most definitive treatment, so will proceed with ureteroscopy  She understands the possibility that this may require 2 stage procedure, just as her left side did  Her distal ureter was very tight and narrow, and she expects the same of the right  Consent obtained for right ureteroscopy, laser, stone extraction, stent placement  All questions answered to her satisfaction  History of Present Illness  Harjeet Johnson is a 80 y o  female recently status post 2 stage ureteroscopy and extraction of large left renal calculus, returns in follow-up  She has no symptoms or complaints  However, ultrasound and KUB reveal right UPJ calculus measuring about 1 2 cm  Review of Systems  Review of Systems   Constitutional: Negative  HENT: Negative  Respiratory: Negative  Cardiovascular: Negative  Gastrointestinal: Negative  Genitourinary:        As per HPI   Musculoskeletal: Negative  Skin: Negative  Neurological: Negative  Hematological: Negative            Past Medical History  Past Medical History:   Diagnosis Date    Epilepsy ([address] )     petite mal - last seizure approx 26 yrs age   [facility] History of bilateral breast implants     Kidney stone     left side    Lyme disease     2016       Past Social History  Past Surgical History:   Procedure Laterality Date    BREAST SURGERY      Augmentation with breast implants    COLONOSCOPY      FL RETROGRADE PYELOGRAM  5/8/2019    FL RETROGRADE PYELOGRAM  5/28/2019    [place] CYSTO/URETERO W/LITHOTRIPSY &INDWELL STENT INSRT Left 5/8/2019    Procedure: Saima Graves; RETROGRADE PYELOGRAM AND INSERTION STENT URETERAL;  Surgeon: Mario Dickinson Patience Johns MD;  [facility];  Service: Urology    [place] CYSTO/URETERO W/LITHOTRIPSY &INDWELL STENT INSRT Left 5/28/2019    Procedure: CYSTOSCOPY URETEROSCOPY WITH LITHOTRIPSY HOLMIUM LASER, RETROGRADE PYELOGRAM AND INSERTION STENT URETERAL;  Surgeon: Abigail Salcedo MD;  [facility];  Service: Urology    [address] Right     "Open"    WISDOM TOOTH EXTRACTION         Past Family History  Family History   Problem Relation Age of Onset    Stroke Father     Ovarian cancer Maternal Aunt        Past Social history  Social History     Socioeconomic History    Marital status: /Civil Union     Spouse name: Not on file    Number of children: Not on file    Years of education: Not on file    Highest education level: Not on file   Occupational History    Occupation: , , educator   Social Needs    Financial resource strain: Not on file    Food insecurity:     Worry: Not on file     Inability: Not on file    Transportation needs:     Medical: Not on file     Non-medical: Not on file   Tobacco Use    Smoking status: Never Smoker    Smokeless tobacco: Never Used   Substance and Sexual Activity    Alcohol use: Yes     Frequency: 4 or more times a week     Drinks per session: 1 or 2     Binge frequency: Never     Comment: 1 drink per night bourbon    Drug use: No    Sexual activity: Not on file   Lifestyle    Physical activity:     Days per week: Not on file     Minutes per session: Not on file    Stress: Not on file   Relationships    Social connections:     Talks on phone: Not on file     Gets together: Not on file     Attends Synagogue service: Not on file     Active member of club or organization: Not on file     Attends meetings of clubs or organizations: Not on file     Relationship status: Not on file    Intimate partner violence:     Fear of current or ex partner: Not on file     Emotionally abused: Not on file     Physically abused: Not on file     Forced sexual activity: Not on file   Other Topics Concern    Not on file   Social History Narrative    Not on file     Social History     Tobacco Use   Smoking Status Never Smoker   Smokeless Tobacco Never Used       Current Medications  Current Outpatient Medications   Medication Sig Dispense Refill    aspirin (ECOTRIN LOW STRENGTH) 81 mg EC tablet Take 81 mg by mouth daily in the early morning      cholecalciferol (VITAMIN D3) 1,000 units tablet Take 2,000 Units by mouth daily      estradiol-norethindrone (COMBIPATCH) 0 05-0 14 MG/DAY Place 1 patch on the skin 2 (two) times a week      Omega-3 Fatty Acids ([employer] FISH OIL) 1000 MG CAPS Take by mouth daily       sertraline (ZOLOFT) 25 mg tablet Take 25 mg by mouth daily in the early morning        No current facility-administered medications for this visit  Allergies  Allergies   Allergen Reactions    Crestor [Rosuvastatin]     Zocor [Simvastatin]     Cephalexin Rash    Ezetimibe Rash    Pravastatin Rash       Past Medical History, Social History, Family History, medications and allergies were reviewed  Vitals  Vitals:    06/17/19 1307   BP: 110/60   Pulse: (!) 51   Weight: 49 4 kg (109 lb)   Height: 5' 4" (1 626 m)       Physical Exam  Physical Exam   Constitutional: She is oriented to person, place, and time  She appears well-developed and well-nourished  Cardiovascular: Normal rate and regular rhythm  Pulmonary/Chest: Effort normal and breath sounds normal    Abdominal: Soft  Genitourinary:   Genitourinary Comments: No CVA tenderness  Musculoskeletal: Normal range of motion  Neurological: She is alert and oriented to person, place, and time  Skin: Skin is warm, dry and intact  Psychiatric: She has a normal mood and affect  Vitals reviewed          Results  No results found for: PSA  Lab Results   Component Value Date    CALCIUM 8 7 02/17/2017    K 3 8 02/17/2017    CO2 27 02/17/2017     02/17/2017 BUN 24 04/19/2019    CREATININE 0 85 04/19/2019     Lab Results   Component Value Date    WBC 7 78 02/17/2017    HGB 13 5 02/17/2017    HCT 39 4 02/17/2017    MCV 94 02/17/2017     02/17/2017

## 2019-06-18 ENCOUNTER — TELEPHONE (OUTPATIENT)
Dept: UROLOGY | Facility: AMBULATORY SURGERY CENTER | Age: 69
End: 2019-06-18

## 2019-06-18 PROBLEM — N20.0 RENAL CALCULUS, RIGHT: Status: ACTIVE | Noted: 2019-06-18

## 2019-07-05 NOTE — PRE-PROCEDURE INSTRUCTIONS
Pre-Surgery Instructions:   Medication Instructions    aspirin (ECOTRIN LOW STRENGTH) 81 mg EC tablet Instructed patient per Anesthesia Guidelines   cholecalciferol (VITAMIN D3) 1,000 units tablet Instructed patient per Anesthesia Guidelines   estradiol-norethindrone (COMBIPATCH) 0 05-0 14 MG/DAY Instructed patient per Anesthesia Guidelines   Omega-3 Fatty Acids (CVS FISH OIL) 1000 MG CAPS Instructed patient per Anesthesia Guidelines   sertraline (ZOLOFT) 25 mg tablet Instructed patient per Anesthesia Guidelines  Continue to take this medication on your normal schedule  If this is an oral medication and you take it in the morning, then you may take this medicine with a sip of water  ASA Med Class: Aspirin     Should be discontinued at least one week prior to planned operation, unless specifically stated otherwise by surgical service  Your Surgeon may have patient stop taking aspirin up to a week before surgery if having intracranial, middle ear, posterior eye, spine surgery or prostate surgery  [Patients taking aspirin for coronary stents should be reviewed by an anesthesiologist in the optimization clinic  Please do not discontinue aspirin in patients with coronary stents unless given specific permission to do so by the cardiologist who prescribed medication ]   If your surgeon approves please continue to take this medication on your normal schedule  You may take this medication on the morning of your surgery with a sip of water  HRT Med Class     Continue to take this medication on your normal schedule  If this is an oral medication and you take it in the morning, then you may take this medicine with a sip of water  This medication may need to be discontinued for a least 4 weeks prior to your surgery if the surgical procedure is associated with a high risk of blod clots as in hip or knee replacement for example    Please consult with your Surgeon to discuss discontinuing this medication before your surgery

## 2019-07-05 NOTE — PRE-PROCEDURE INSTRUCTIONS
Pre-Surgery Instructions:   Medication Instructions    aspirin (ECOTRIN LOW STRENGTH) 81 mg EC tablet Instructed patient per Anesthesia Guidelines   cholecalciferol (VITAMIN D3) 1,000 units tablet Instructed patient per Anesthesia Guidelines   estradiol-norethindrone (COMBIPATCH) 0 05-0 14 MG/DAY Instructed patient per Anesthesia Guidelines   Omega-3 Fatty Acids (CVS FISH OIL) 1000 MG CAPS Instructed patient per Anesthesia Guidelines   sertraline (ZOLOFT) 25 mg tablet Instructed patient per Anesthesia Guidelines

## 2019-07-09 ENCOUNTER — HOSPITAL ENCOUNTER (OUTPATIENT)
Facility: HOSPITAL | Age: 69
Setting detail: OUTPATIENT SURGERY
Discharge: HOME | End: 2019-07-09
Attending: UROLOGY | Admitting: UROLOGY
Payer: COMMERCIAL

## 2019-07-09 ENCOUNTER — ANESTHESIA (OUTPATIENT)
Dept: PERIOP | Facility: HOSPITAL | Age: 69
End: 2019-07-09
Payer: COMMERCIAL

## 2019-07-09 ENCOUNTER — APPOINTMENT (OUTPATIENT)
Dept: RADIOLOGY | Facility: HOSPITAL | Age: 69
End: 2019-07-09
Payer: COMMERCIAL

## 2019-07-09 ENCOUNTER — ANESTHESIA EVENT (OUTPATIENT)
Dept: PERIOP | Facility: HOSPITAL | Age: 69
End: 2019-07-09
Payer: COMMERCIAL

## 2019-07-09 ENCOUNTER — TELEPHONE (OUTPATIENT)
Dept: UROLOGY | Facility: AMBULATORY SURGERY CENTER | Age: 69
End: 2019-07-09

## 2019-07-09 VITALS
TEMPERATURE: 96.2 F | RESPIRATION RATE: 18 BRPM | OXYGEN SATURATION: 100 % | WEIGHT: 107 LBS | DIASTOLIC BLOOD PRESSURE: 51 MMHG | BODY MASS INDEX: 17.83 KG/M2 | SYSTOLIC BLOOD PRESSURE: 120 MMHG | HEART RATE: 51 BPM | HEIGHT: 65 IN

## 2019-07-09 DIAGNOSIS — N20.0 RENAL CALCULUS, RIGHT: Primary | ICD-10-CM

## 2019-07-09 PROCEDURE — C1769 GUIDE WIRE: HCPCS | Performed by: UROLOGY

## 2019-07-09 PROCEDURE — C2617 STENT, NON-COR, TEM W/O DEL: HCPCS | Performed by: UROLOGY

## 2019-07-09 PROCEDURE — 74420 UROGRAPHY RTRGR +-KUB: CPT

## 2019-07-09 PROCEDURE — C1758 CATHETER, URETERAL: HCPCS | Performed by: UROLOGY

## 2019-07-09 PROCEDURE — 52332 CYSTOSCOPY AND TREATMENT: CPT | Performed by: UROLOGY

## 2019-07-09 PROCEDURE — C1894 INTRO/SHEATH, NON-LASER: HCPCS | Performed by: UROLOGY

## 2019-07-09 PROCEDURE — 52351 CYSTOURETERO & OR PYELOSCOPE: CPT | Performed by: UROLOGY

## 2019-07-09 DEVICE — STENT URETERAL 6FR 24CML INLAY OPTIMA: Type: IMPLANTABLE DEVICE | Site: URETER | Status: FUNCTIONAL

## 2019-07-09 RX ORDER — LIDOCAINE HYDROCHLORIDE 10 MG/ML
INJECTION, SOLUTION INFILTRATION; PERINEURAL AS NEEDED
Status: DISCONTINUED | OUTPATIENT
Start: 2019-07-09 | End: 2019-07-09 | Stop reason: SURG

## 2019-07-09 RX ORDER — ONDANSETRON 2 MG/ML
4 INJECTION INTRAMUSCULAR; INTRAVENOUS ONCE AS NEEDED
Status: DISCONTINUED | OUTPATIENT
Start: 2019-07-09 | End: 2019-07-09 | Stop reason: HOSPADM

## 2019-07-09 RX ORDER — SODIUM CHLORIDE, SODIUM LACTATE, POTASSIUM CHLORIDE, CALCIUM CHLORIDE 600; 310; 30; 20 MG/100ML; MG/100ML; MG/100ML; MG/100ML
125 INJECTION, SOLUTION INTRAVENOUS CONTINUOUS
Status: DISCONTINUED | OUTPATIENT
Start: 2019-07-09 | End: 2019-07-09 | Stop reason: HOSPADM

## 2019-07-09 RX ORDER — FENTANYL CITRATE 50 UG/ML
INJECTION, SOLUTION INTRAMUSCULAR; INTRAVENOUS AS NEEDED
Status: DISCONTINUED | OUTPATIENT
Start: 2019-07-09 | End: 2019-07-09 | Stop reason: SURG

## 2019-07-09 RX ORDER — CIPROFLOXACIN 2 MG/ML
400 INJECTION, SOLUTION INTRAVENOUS ONCE
Status: DISCONTINUED | OUTPATIENT
Start: 2019-07-09 | End: 2019-07-09 | Stop reason: HOSPADM

## 2019-07-09 RX ORDER — HYDROCODONE BITARTRATE AND ACETAMINOPHEN 5; 325 MG/1; MG/1
1 TABLET ORAL EVERY 4 HOURS PRN
Qty: 5 TABLET | Refills: 0 | Status: SHIPPED | OUTPATIENT
Start: 2019-07-09 | End: 2019-07-11

## 2019-07-09 RX ORDER — PROPOFOL 10 MG/ML
INJECTION, EMULSION INTRAVENOUS AS NEEDED
Status: DISCONTINUED | OUTPATIENT
Start: 2019-07-09 | End: 2019-07-09 | Stop reason: SURG

## 2019-07-09 RX ORDER — DEXAMETHASONE SODIUM PHOSPHATE 10 MG/ML
INJECTION, SOLUTION INTRAMUSCULAR; INTRAVENOUS AS NEEDED
Status: DISCONTINUED | OUTPATIENT
Start: 2019-07-09 | End: 2019-07-09 | Stop reason: SURG

## 2019-07-09 RX ORDER — CIPROFLOXACIN 500 MG/1
500 TABLET, FILM COATED ORAL 2 TIMES DAILY
Qty: 4 TABLET | Refills: 0 | Status: SHIPPED | OUTPATIENT
Start: 2019-07-09 | End: 2019-07-11

## 2019-07-09 RX ORDER — GLYCOPYRROLATE 0.2 MG/ML
INJECTION INTRAMUSCULAR; INTRAVENOUS AS NEEDED
Status: DISCONTINUED | OUTPATIENT
Start: 2019-07-09 | End: 2019-07-09 | Stop reason: SURG

## 2019-07-09 RX ORDER — FENTANYL CITRATE/PF 50 MCG/ML
25 SYRINGE (ML) INJECTION
Status: DISCONTINUED | OUTPATIENT
Start: 2019-07-09 | End: 2019-07-09 | Stop reason: HOSPADM

## 2019-07-09 RX ORDER — MAGNESIUM HYDROXIDE 1200 MG/15ML
LIQUID ORAL AS NEEDED
Status: DISCONTINUED | OUTPATIENT
Start: 2019-07-09 | End: 2019-07-09 | Stop reason: HOSPADM

## 2019-07-09 RX ORDER — METOCLOPRAMIDE HYDROCHLORIDE 5 MG/ML
10 INJECTION INTRAMUSCULAR; INTRAVENOUS ONCE AS NEEDED
Status: DISCONTINUED | OUTPATIENT
Start: 2019-07-09 | End: 2019-07-09 | Stop reason: HOSPADM

## 2019-07-09 RX ORDER — EPHEDRINE SULFATE 50 MG/ML
INJECTION INTRAVENOUS AS NEEDED
Status: DISCONTINUED | OUTPATIENT
Start: 2019-07-09 | End: 2019-07-09 | Stop reason: SURG

## 2019-07-09 RX ORDER — ONDANSETRON 2 MG/ML
INJECTION INTRAMUSCULAR; INTRAVENOUS AS NEEDED
Status: DISCONTINUED | OUTPATIENT
Start: 2019-07-09 | End: 2019-07-09 | Stop reason: SURG

## 2019-07-09 RX ORDER — LIDOCAINE HYDROCHLORIDE 10 MG/ML
0.5 INJECTION, SOLUTION EPIDURAL; INFILTRATION; INTRACAUDAL; PERINEURAL ONCE AS NEEDED
Status: COMPLETED | OUTPATIENT
Start: 2019-07-09 | End: 2019-07-09

## 2019-07-09 RX ADMIN — PROPOFOL 150 MG: 10 INJECTION, EMULSION INTRAVENOUS at 15:56

## 2019-07-09 RX ADMIN — ONDANSETRON 4 MG: 2 INJECTION INTRAMUSCULAR; INTRAVENOUS at 16:01

## 2019-07-09 RX ADMIN — EPHEDRINE SULFATE 10 MG: 50 INJECTION, SOLUTION INTRAVENOUS at 16:10

## 2019-07-09 RX ADMIN — PROPOFOL 40 MG: 10 INJECTION, EMULSION INTRAVENOUS at 16:20

## 2019-07-09 RX ADMIN — CIPROFLOXACIN 400 MG: 2 INJECTION INTRAVENOUS at 15:50

## 2019-07-09 RX ADMIN — LIDOCAINE HYDROCHLORIDE 50 MG: 10 INJECTION, SOLUTION INFILTRATION; PERINEURAL at 15:56

## 2019-07-09 RX ADMIN — LIDOCAINE HYDROCHLORIDE 0.5 ML: 10 INJECTION, SOLUTION EPIDURAL; INFILTRATION; INTRACAUDAL; PERINEURAL at 14:30

## 2019-07-09 RX ADMIN — DEXAMETHASONE SODIUM PHOSPHATE 10 MG: 10 INJECTION, SOLUTION INTRAMUSCULAR; INTRAVENOUS at 16:00

## 2019-07-09 RX ADMIN — FENTANYL CITRATE 25 MCG: 50 INJECTION, SOLUTION INTRAMUSCULAR; INTRAVENOUS at 16:23

## 2019-07-09 RX ADMIN — SODIUM CHLORIDE, SODIUM LACTATE, POTASSIUM CHLORIDE, AND CALCIUM CHLORIDE 125 ML/HR: .6; .31; .03; .02 INJECTION, SOLUTION INTRAVENOUS at 14:30

## 2019-07-09 RX ADMIN — EPHEDRINE SULFATE 5 MG: 50 INJECTION, SOLUTION INTRAVENOUS at 16:16

## 2019-07-09 RX ADMIN — PROPOFOL 50 MG: 10 INJECTION, EMULSION INTRAVENOUS at 16:00

## 2019-07-09 RX ADMIN — EPHEDRINE SULFATE 5 MG: 50 INJECTION, SOLUTION INTRAVENOUS at 16:28

## 2019-07-09 RX ADMIN — GLYCOPYRROLATE 0.1 MG: 0.2 INJECTION, SOLUTION INTRAMUSCULAR; INTRAVENOUS at 16:05

## 2019-07-09 NOTE — ANESTHESIA POSTPROCEDURE EVALUATION
Post-Op Assessment Note    CV Status:  Stable  Pain Score: 0    Pain management: satisfactory to patient     Mental Status:  Sleepy and awake   Hydration Status:  Stable   PONV Controlled:  None   Airway Patency:  Patent   Post Op Vitals Reviewed: Yes      Staff: Anesthesiologist, CRNA           BP   112/47   Temp 97 6 °F (36 4 °C) (07/09/19 1639)    Pulse 58 (07/09/19 1639)   Resp   18   SpO2 99

## 2019-07-09 NOTE — TELEPHONE ENCOUNTER
Received an voicemail (timed 7:17pm)  upon my arrival this am that Patient had not received her arrival call for surgery in PM today  I called charge nurse @ [facility] asking for a call to make sure Patient received call  I called Patient and LMOVM to see if he arrival call came after she called me  Charge nurse returned my call and stated Patient received call @ 7:27 last night about 1:30 arrival for surgery today

## 2019-07-09 NOTE — OP NOTE
OPERATIVE REPORT  PATIENT NAME: Renny Hilton    :  1950  MRN: [id number]  Pt Location: [facility]ST[facility] ROOM 01    SURGERY DATE: 2019    Surgeon(s) and Role:     * Adrian Palmer MD - Primary    Preop Diagnosis:  Renal calculus, right [N20 0]    Post-Op Diagnosis Codes:     * Renal calculus, right [N20 0]    Procedure(s) (LRB):  CYSTOSCOPY URETEROSCOPY WITH RETROGRADE PYELOGRAM AND INSERTION STENT URETERAL (Right)    Specimen(s):  * No specimens in log *    Estimated Blood Loss:   Minimal    Drains:  * No LDAs found *    Anesthesia Type:   General    Operative Indications:  Renal calculus, right [N20 0]      Operative Findings:  Right renal pelvis calculus had retreated to right posterior lower pole calyx  This was not reachable by the flexible ureteral scope  Complications:   None    Procedure and Technique:  The patient was identified, brought to the operating room, and placed on the table in supine position  After induction of general anesthesia, the patient was placed in dorsal lithotomy position and prepped and draped in the usual sterile fashion  A complete formal timeout was performed  The 25 Marshallese rigid cystoscope was placed per urethra and cystoscopy was performed  There was no bladder abnormality identified   x-ray imaging revealed calculus previously located in the right renal pelvis had retreated to the right lower pole  The right ureteral orifice was identified and cannulated with a Solo wire  A dual-lumen catheter was placed followed by a second guidewire and 10 Marshallese ureteral access sheath  The 5 3 Marshallese flexible ureteroscope was placed and complete renoscopy was performed  The stone was identified within a posterior lower pole renal calyx  I was unable to maneuver the scope to directly viewed the calculus in close proximity  A holmium laser fiber was placed, however I was unable to target the stone with the laser fiber    Therefore the procedure could not continue as planned  I did decide to leave a stent due to the significant ureteral dilation and access sheath placement  At this point, a retrograde pyelogram was performed delineating the upper urinary tract anatomy  The safety wire was backloaded into the cystoscope and a 24 cm x 6 Welsh double-J stent was placed with string  The patient tolerated the procedure well and was transferred to the recovery room awake alert and in stable condition  I was present for the entire procedure    Patient Disposition:  PACU     Plan  Will maintain stent with string for 3 days and remove on Friday  We will then discussed with patient whether she would desire shockwave lithotripsy or simply observation at this point  Previous calculus was about 75% calcium oxalate monohydrate      SIGNATURE: Terrie Demarco MD  DATE: July 9, 2019  TIME: 5:00 PM

## 2019-07-09 NOTE — TELEPHONE ENCOUNTER
Patient seen by Dr Lurdes Mendoza in [facility] has Surgery today and is questioning being allowed to have any water before her arrival time at 1:30  Stated her BP is always low around 108/80 and tends to dehydrate  Can she have sips of water prior to her procedure? Recommendation from paperwork for surgery stated nothing after 8 am     Spoke to Dr Lurdes Mendoza he stated that the recommendations come from anesthesia and should be followed

## 2019-07-09 NOTE — ANESTHESIA PREPROCEDURE EVALUATION
Review of Systems/Medical History  Patient summary reviewed  Chart reviewed      Cardiovascular  Negative cardio ROS Exercise tolerance (METS): >4,    Comment: Sinus bradycardia with 1st degree A-V block  Otherwise normal ECG  When compared with ECG of 17-FEB-2017 19:50,  No significant change was found  Confirmed by Juliet Ta ([id number]) on 5/8/2019 4:41:19 PM,  Pulmonary  Negative pulmonary ROS        GI/Hepatic    No GERD ,        Kidney stones,        Endo/Other  Negative endo/other ROS      GYN  Negative gynecology ROS          Hematology  Negative hematology ROS      Musculoskeletal  Negative musculoskeletal ROS        Neurology  Seizures ,     Psychology   Negative psychology ROS              Physical Exam    Airway    Mallampati score: I  TM Distance: >3 FB  Neck ROM: full     Dental   No notable dental hx     Cardiovascular  Comment: Negative ROS, Cardiovascular exam normal    Pulmonary  Pulmonary exam normal     Other Findings        Anesthesia Plan  ASA Score- 2     Anesthesia Type- general with ASA Monitors  Additional Monitors:   Airway Plan: LMA  Plan Factors-  Patient did not smoke on day of surgery  Induction- intravenous  Postoperative Plan-     Informed Consent- Anesthetic plan and risks discussed with patient  I personally reviewed this patient with the CRNA  Discussed and agreed on the Anesthesia Plan with the CRNA  Latisha Vega

## 2019-07-09 NOTE — INTERVAL H&P NOTE
H&P reviewed  After examining the patient I find no changes in the patients condition since the H&P had been written  /59   Pulse (!) 49   Temp 99 2 °F (37 3 °C) (Tympanic Core)   Resp 16   Ht 5' 4 5" (1 638 m)   Wt 48 5 kg (107 lb)   SpO2 99%   BMI 18 08 kg/m²   Proceed as planned with right ureteroscopy, laser, stone extraction, stent  Procedure reviewed and questions answered

## 2019-07-10 ENCOUNTER — TELEPHONE (OUTPATIENT)
Dept: UROLOGY | Facility: CLINIC | Age: 69
End: 2019-07-10

## 2019-07-10 ENCOUNTER — TELEPHONE (OUTPATIENT)
Dept: UROLOGY | Facility: HOSPITAL | Age: 69
End: 2019-07-10

## 2019-07-10 NOTE — TELEPHONE ENCOUNTER
Patient returned call  Patient scheduled for Friday 7/12/19 at 8:30 in the [facility] [place] with Dr Mike Segovia for stent removal and to discuss additional surgery  Patient reports doing well since surgery, denies any pain or symptoms  Currently comfortable  Patient knows to call office with any questions or concerns  Voiced she knows common stent symptoms  Has previously had a stent

## 2019-07-10 NOTE — TELEPHONE ENCOUNTER
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

## 2019-07-10 NOTE — TELEPHONE ENCOUNTER
Post Op Note    Denis Hernandez is a 80 y o  female s/p CYSTOSCOPY URETEROSCOPY WITH RETROGRADE PYELOGRAM AND INSERTION STENT URETERAL (Right) performed 7/9/19  Denis Hernandez is a patient of Dr Phani Enriquez and is seen at the [place] office  Left message for patient to return call to see how she is doing since surgery and schedule follow up  Per Dr Phani Enriquez, patient's stent with string can be removed on Friday, will also need appointment with provider to discuss next steps for stone treatment  Looking at scheduling patient with Dr Phani Enriquez on Friday, 7/12/19 at 8:30 in the [place] office

## 2019-07-10 NOTE — TELEPHONE ENCOUNTER
Returned call to patient  Per patient, she left this message prior to scheduling her appointment  Reports she is not able to make an afternoon appointment  Per patient, ok to keep appointment as scheduled at 8:30 on 7/12

## 2019-07-12 ENCOUNTER — TELEPHONE (OUTPATIENT)
Dept: UROLOGY | Facility: AMBULATORY SURGERY CENTER | Age: 69
End: 2019-07-12

## 2019-07-12 ENCOUNTER — OFFICE VISIT (OUTPATIENT)
Dept: UROLOGY | Facility: AMBULATORY SURGERY CENTER | Age: 69
End: 2019-07-12
Payer: COMMERCIAL

## 2019-07-12 VITALS
WEIGHT: 107 LBS | DIASTOLIC BLOOD PRESSURE: 60 MMHG | HEART RATE: 64 BPM | BODY MASS INDEX: 17.83 KG/M2 | SYSTOLIC BLOOD PRESSURE: 104 MMHG | HEIGHT: 65 IN

## 2019-07-12 DIAGNOSIS — N20.0 NEPHROLITHIASIS: Primary | ICD-10-CM

## 2019-07-12 PROCEDURE — 99214 OFFICE O/P EST MOD 30 MIN: CPT | Performed by: UROLOGY

## 2019-07-12 RX ORDER — CIPROFLOXACIN 2 MG/ML
400 INJECTION, SOLUTION INTRAVENOUS ONCE
Status: CANCELLED | OUTPATIENT
Start: 2019-07-12 | End: 2019-07-12

## 2019-07-12 NOTE — H&P (VIEW-ONLY)
7/12/2019    Marlon Fernandez  1950  [id number]        Assessment  Nephrolithiasis    Plan  We discussed options including observation, ESWL  I do not recommend repeat ureteroscopy as long as the stone is in that location  I also do not recommend PCNL  We discussed the technique, risks, benefits of ESWL  She would like to proceed  Consent was obtained for right ESWL  This will be performed at her convenience  History of Present Illness  Alyssia Grace is a 80 y o  female status post left ureteroscopy, as well as subsequent right ureteroscopy  Unfortunately right ureteroscopy was unsuccessful, as for large UPJ calculus retreated into a lower pole calyx and was not reachable by the flexible scope  Her ureteral stent was removed  We reviewed her imaging studies  Calculus is well-visualized in the UPJ on the preop imaging, however on the operative imaging, it has clearly retracted into the lower pole prior to instrumentation  She has no current complaints  Review of Systems  Review of Systems   Constitutional: Negative  HENT: Negative  Respiratory: Negative  Cardiovascular: Negative  Gastrointestinal: Negative  Genitourinary:        As per HPI   Musculoskeletal: Negative  Skin: Negative  Neurological: Negative  Hematological: Negative            Past Medical History  Past Medical History:   Diagnosis Date    Epile[address] ([facility] [address] )     petite mal - last seizure approx 26 yrs age   [facility] History of bilateral breast implants     Kidney stone     left side    Lyme disease     2016       Past Social History  Past Surgical History:   Procedure Laterality Date    BREAST SURGERY      Augmentation with breast implants    COLONOSCOPY      FL RETROGRADE PYELOGRAM  5/8/2019    FL RETROGRADE PYELOGRAM  5/28/2019    [place] CYSTO/URETERO W/LITHOTRIPSY &INDWELL STENT INSRT Left 5/8/2019    Procedure: CYSTOSCOPY; RETROGRADE PYELOGRAM AND INSERTION STENT URETERAL;  Surgeon: Jonh Agarwal MD; [facility];  Service: Urology    [place] CYSTO/URETERO W/LITHOTRIPSY &INDWELL STENT INSRT Left 5/28/2019    Procedure: CYSTOSCOPY URETEROSCOPY WITH LITHOTRIPSY HOLMIUM LASER, RETROGRADE PYELOGRAM AND INSERTION STENT URETERAL;  Surgeon: Adrian Palmer MD;  [facility];  Service: Urology    [place] CYSTO/URETERO W/LITHOTRIPSY &INDWELL STENT INSRT Right 7/9/2019    Procedure: CYSTOSCOPY URETEROSCOPY WITH RETROGRADE PYELOGRAM AND INSERTION STENT URETERAL;  Surgeon: Adrian Palmer MD;  [facility];  Service: Urology    ROTATOR CUFF REPAIR Right     "Open"    WISDOM TOOTH EXTRACTION         Past Family History  Family History   Problem Relation Age of Onset    Stroke Father     Ovarian cancer Maternal Aunt        Past Social history  Social History     Socioeconomic History    Marital status: /Civil Union     Spouse name: Not on file    Number of children: Not on file    Years of education: Not on file    Highest education level: Not on file   Occupational History    Occupation: , , educator   Social Needs    Financial resource strain: Not on file    Food insecurity:     Worry: Not on file     Inability: Not on file    Transportation needs:     Medical: Not on file     Non-medical: Not on file   Tobacco Use    Smoking status: Never Smoker    Smokeless tobacco: Never Used   Substance and Sexual Activity    Alcohol use: Yes     Frequency: 4 or more times a week     Drinks per session: 1 or 2     Binge frequency: Never     Comment: 1 drink per night bourbon    Drug use: No    Sexual activity: Not on file   Lifestyle    Physical activity:     Days per week: Not on file     Minutes per session: Not on file    Stress: Not on file   Relationships    Social connections:     Talks on phone: Not on file     Gets together: Not on file     Attends Faith service: Not on file     Active member of club or organization: Not on file Attends meetings of clubs or organizations: Not on file     Relationship status: Not on file    Intimate partner violence:     Fear of current or ex partner: Not on file     Emotionally abused: Not on file     Physically abused: Not on file     Forced sexual activity: Not on file   Other Topics Concern    Not on file   Social History Narrative    Not on file     Social History     Tobacco Use   Smoking Status Never Smoker   Smokeless Tobacco Never Used       Current Medications  Current Outpatient Medications   Medication Sig Dispense Refill    aspirin (ECOTRIN LOW STRENGTH) 81 mg EC tablet Take 81 mg by mouth daily in the early morning      cholecalciferol (VITAMIN D3) 1,000 units tablet Take 2,000 Units by mouth daily      estradiol-norethindrone (COMBIPATCH) 0 05-0 14 MG/DAY Place 1 patch on the skin 2 (two) times a week      Omega-3 Fatty Acids ([employer]S FISH OIL) 1000 MG CAPS Take by mouth daily       sertraline (ZOLOFT) 25 mg tablet Take 25 mg by mouth daily in the early morning        No current facility-administered medications for this visit  Allergies  Allergies   Allergen Reactions    Crestor [Rosuvastatin] Hives    Zocor [Simvastatin] Hives    Cephalexin Rash    Ezetimibe Rash    Penicillins Rash    Pravastatin Rash       Past Medical History, Social History, Family History, medications and allergies were reviewed  Vitals  Vitals:    07/12/19 0844   BP: 104/60   BP Location: Left arm   Patient Position: Sitting   Cuff Size: Standard   Pulse: 64   Weight: 48 5 kg (107 lb)   Height: 5' 4 5" (1 638 m)       Physical Exam  Physical Exam   Constitutional: She is oriented to person, place, and time  She appears well-developed and well-nourished  Cardiovascular: Normal rate and regular rhythm  Pulmonary/Chest: Effort normal and breath sounds normal    Abdominal: Soft  Genitourinary:   Genitourinary Comments: No CVA tenderness   Musculoskeletal: Normal range of motion     Neurological: She is alert and oriented to person, place, and time  Skin: Skin is warm, dry and intact  Psychiatric: She has a normal mood and affect  Vitals reviewed          Results  No results found for: PSA  Lab Results   Component Value Date    CALCIUM 8 7 02/17/2017    K 3 8 02/17/2017    CO2 27 02/17/2017     02/17/2017    BUN 24 04/19/2019    CREATININE 0 85 04/19/2019     Lab Results   Component Value Date    WBC 7 78 02/17/2017    HGB 13 5 02/17/2017    HCT 39 4 02/17/2017    MCV 94 02/17/2017     02/17/2017

## 2019-07-12 NOTE — PROGRESS NOTES
7/12/2019    Rosalie Leon  1950  [id number]        Assessment  Nephrolithiasis    Plan  We discussed options including observation, ESWL  I do not recommend repeat ureteroscopy as long as the stone is in that location  I also do not recommend PCNL  We discussed the technique, risks, benefits of ESWL  She would like to proceed  Consent was obtained for right ESWL  This will be performed at her convenience  History of Present Illness  Ana M Henderson is a 80 y o  female status post left ureteroscopy, as well as subsequent right ureteroscopy  Unfortunately right ureteroscopy was unsuccessful, as for large UPJ calculus retreated into a lower pole calyx and was not reachable by the flexible scope  Her ureteral stent was removed  We reviewed her imaging studies  Calculus is well-visualized in the UPJ on the preop imaging, however on the operative imaging, it has clearly retracted into the lower pole prior to instrumentation  She has no current complaints  Review of Systems  Review of Systems   Constitutional: Negative  HENT: Negative  Respiratory: Negative  Cardiovascular: Negative  Gastrointestinal: Negative  Genitourinary:        As per HPI   Musculoskeletal: Negative  Skin: Negative  Neurological: Negative  Hematological: Negative            Past Medical History  Past Medical History:   Diagnosis Date    Epilepsy ([facility] [address] )     petite mal - last seizure approx 26 yrs age   Deadra Moises History of bilateral breast implants     Kidney stone     left side    Lyme disease     2016       Past Social History  Past Surgical History:   Procedure Laterality Date    BREAST SURGERY      Augmentation with breast implants    COLONOSCOPY      FL RETROGRADE PYELOGRAM  5/8/2019    FL RETROGRADE PYELOGRAM  5/28/2019    FL CYSTO/URETERO W/LITHOTRIPSY &INDWELL STENT INSRT Left 5/8/2019    Procedure: CYSTOSCOPY; RETROGRADE PYELOGRAM AND INSERTION STENT URETERAL;  Surgeon: Hoang Cortes MD; [facility];  Service: Urology    [place] CYSTO/URETERO W/LITHOTRIPSY &INDWELL STENT INSRT Left 5/28/2019    Procedure: CYSTOSCOPY URETEROSCOPY WITH LITHOTRIPSY HOLMIUM LASER, RETROGRADE PYELOGRAM AND INSERTION STENT URETERAL;  Surgeon: Moi Carrizales MD;  [facility];  Service: Urology    [place] CYSTO/URETERO W/LITHOTRIPSY &INDWELL STENT INSRT Right 7/9/2019    Procedure: CYSTOSCOPY URETEROSCOPY WITH RETROGRADE PYELOGRAM AND INSERTION STENT URETERAL;  Surgeon: Moi Carrizales MD;  [facility];  Service: Urology    ROTATOR CUFF REPAIR Right     "Open"    WISDOM TOOTH EXTRACTION         Past Family History  Family History   Problem Relation Age of Onset    Stroke Father     Ovarian cancer Maternal Aunt        Past Social history  Social History     Socioeconomic History    Marital status: /Civil Union     Spouse name: Not on file    Number of children: Not on file    Years of education: Not on file    Highest education level: Not on file   Occupational History    Occupation: , , educator   Social Needs    Financial resource strain: Not on file    Food insecurity:     Worry: Not on file     Inability: Not on file    Transportation needs:     Medical: Not on file     Non-medical: Not on file   Tobacco Use    Smoking status: Never Smoker    Smokeless tobacco: Never Used   Substance and Sexual Activity    Alcohol use: Yes     Frequency: 4 or more times a week     Drinks per session: 1 or 2     Binge frequency: Never     Comment: 1 drink per night bourbon    Drug use: No    Sexual activity: Not on file   Lifestyle    Physical activity:     Days per week: Not on file     Minutes per session: Not on file    Stress: Not on file   Relationships    Social connections:     Talks on phone: Not on file     Gets together: Not on file     Attends Cheondoism service: Not on file     Active member of club or organization: Not on file Attends meetings of clubs or organizations: Not on file     Relationship status: Not on file    Intimate partner violence:     Fear of current or ex partner: Not on file     Emotionally abused: Not on file     Physically abused: Not on file     Forced sexual activity: Not on file   Other Topics Concern    Not on file   Social History Narrative    Not on file     Social History     Tobacco Use   Smoking Status Never Smoker   Smokeless Tobacco Never Used       Current Medications  Current Outpatient Medications   Medication Sig Dispense Refill    aspirin (ECOTRIN LOW STRENGTH) 81 mg EC tablet Take 81 mg by mouth daily in the early morning      cholecalciferol (VITAMIN D3) 1,000 units tablet Take 2,000 Units by mouth daily      estradiol-norethindrone (COMBIPATCH) 0 05-0 14 MG/DAY Place 1 patch on the skin 2 (two) times a week      Omega-3 Fatty Acids ([employer] FISH OIL) 1000 MG CAPS Take by mouth daily       sertraline (ZOLOFT) 25 mg tablet Take 25 mg by mouth daily in the early morning        No current facility-administered medications for this visit  Allergies  Allergies   Allergen Reactions    Crestor [Rosuvastatin] Hives    Zocor [Simvastatin] Hives    Cephalexin Rash    Ezetimibe Rash    Penicillins Rash    Pravastatin Rash       Past Medical History, Social History, Family History, medications and allergies were reviewed  Vitals  Vitals:    07/12/19 0844   BP: 104/60   BP Location: Left arm   Patient Position: Sitting   Cuff Size: Standard   Pulse: 64   Weight: 48 5 kg (107 lb)   Height: 5' 4 5" (1 638 m)       Physical Exam  Physical Exam   Constitutional: She is oriented to person, place, and time  She appears well-developed and well-nourished  Cardiovascular: Normal rate and regular rhythm  Pulmonary/Chest: Effort normal and breath sounds normal    Abdominal: Soft  Genitourinary:   Genitourinary Comments: No CVA tenderness   Musculoskeletal: Normal range of motion     Neurological: She is alert and oriented to person, place, and time  Skin: Skin is warm, dry and intact  Psychiatric: She has a normal mood and affect  Vitals reviewed          Results  No results found for: PSA  Lab Results   Component Value Date    CALCIUM 8 7 02/17/2017    K 3 8 02/17/2017    CO2 27 02/17/2017     02/17/2017    BUN 24 04/19/2019    CREATININE 0 85 04/19/2019     Lab Results   Component Value Date    WBC 7 78 02/17/2017    HGB 13 5 02/17/2017    HCT 39 4 02/17/2017    MCV 94 02/17/2017     02/17/2017

## 2019-07-15 ENCOUNTER — TELEPHONE (OUTPATIENT)
Dept: UROLOGY | Facility: AMBULATORY SURGERY CENTER | Age: 69
End: 2019-07-15

## 2019-08-02 ENCOUNTER — ANESTHESIA EVENT (OUTPATIENT)
Dept: PERIOP | Facility: HOSPITAL | Age: 69
End: 2019-08-02
Payer: COMMERCIAL

## 2019-08-02 ENCOUNTER — ANESTHESIA (OUTPATIENT)
Dept: PERIOP | Facility: HOSPITAL | Age: 69
End: 2019-08-02
Payer: COMMERCIAL

## 2019-08-02 ENCOUNTER — HOSPITAL ENCOUNTER (OUTPATIENT)
Facility: HOSPITAL | Age: 69
Setting detail: OUTPATIENT SURGERY
Discharge: HOME | End: 2019-08-02
Attending: UROLOGY | Admitting: UROLOGY
Payer: COMMERCIAL

## 2019-08-02 VITALS
WEIGHT: 107 LBS | HEIGHT: 65 IN | OXYGEN SATURATION: 100 % | BODY MASS INDEX: 17.83 KG/M2 | TEMPERATURE: 96.3 F | DIASTOLIC BLOOD PRESSURE: 73 MMHG | HEART RATE: 52 BPM | SYSTOLIC BLOOD PRESSURE: 130 MMHG | RESPIRATION RATE: 18 BRPM

## 2019-08-02 PROCEDURE — 50590 FRAGMENTING OF KIDNEY STONE: CPT | Performed by: UROLOGY

## 2019-08-02 RX ORDER — IBUPROFEN 600 MG/1
600 TABLET ORAL EVERY 6 HOURS PRN
Status: DISCONTINUED | OUTPATIENT
Start: 2019-08-02 | End: 2019-08-02 | Stop reason: HOSPADM

## 2019-08-02 RX ORDER — PROPOFOL 10 MG/ML
INJECTION, EMULSION INTRAVENOUS AS NEEDED
Status: DISCONTINUED | OUTPATIENT
Start: 2019-08-02 | End: 2019-08-02 | Stop reason: SURG

## 2019-08-02 RX ORDER — FENTANYL CITRATE/PF 50 MCG/ML
25 SYRINGE (ML) INJECTION
Status: DISCONTINUED | OUTPATIENT
Start: 2019-08-02 | End: 2019-08-02 | Stop reason: HOSPADM

## 2019-08-02 RX ORDER — EPHEDRINE SULFATE 50 MG/ML
INJECTION INTRAVENOUS AS NEEDED
Status: DISCONTINUED | OUTPATIENT
Start: 2019-08-02 | End: 2019-08-02 | Stop reason: SURG

## 2019-08-02 RX ORDER — DEXAMETHASONE SODIUM PHOSPHATE 10 MG/ML
INJECTION, SOLUTION INTRAMUSCULAR; INTRAVENOUS AS NEEDED
Status: DISCONTINUED | OUTPATIENT
Start: 2019-08-02 | End: 2019-08-02 | Stop reason: SURG

## 2019-08-02 RX ORDER — LIDOCAINE HYDROCHLORIDE 10 MG/ML
0.5 INJECTION, SOLUTION EPIDURAL; INFILTRATION; INTRACAUDAL; PERINEURAL ONCE AS NEEDED
Status: COMPLETED | OUTPATIENT
Start: 2019-08-02 | End: 2019-08-02

## 2019-08-02 RX ORDER — LIDOCAINE HYDROCHLORIDE 10 MG/ML
INJECTION, SOLUTION INFILTRATION; PERINEURAL AS NEEDED
Status: DISCONTINUED | OUTPATIENT
Start: 2019-08-02 | End: 2019-08-02 | Stop reason: SURG

## 2019-08-02 RX ORDER — CIPROFLOXACIN 2 MG/ML
400 INJECTION, SOLUTION INTRAVENOUS ONCE
Status: COMPLETED | OUTPATIENT
Start: 2019-08-02 | End: 2019-08-02

## 2019-08-02 RX ORDER — ONDANSETRON 2 MG/ML
4 INJECTION INTRAMUSCULAR; INTRAVENOUS ONCE AS NEEDED
Status: DISCONTINUED | OUTPATIENT
Start: 2019-08-02 | End: 2019-08-02 | Stop reason: HOSPADM

## 2019-08-02 RX ORDER — SODIUM CHLORIDE, SODIUM LACTATE, POTASSIUM CHLORIDE, CALCIUM CHLORIDE 600; 310; 30; 20 MG/100ML; MG/100ML; MG/100ML; MG/100ML
125 INJECTION, SOLUTION INTRAVENOUS CONTINUOUS
Status: DISCONTINUED | OUTPATIENT
Start: 2019-08-02 | End: 2019-08-02 | Stop reason: HOSPADM

## 2019-08-02 RX ORDER — FENTANYL CITRATE 50 UG/ML
INJECTION, SOLUTION INTRAMUSCULAR; INTRAVENOUS AS NEEDED
Status: DISCONTINUED | OUTPATIENT
Start: 2019-08-02 | End: 2019-08-02 | Stop reason: SURG

## 2019-08-02 RX ORDER — ONDANSETRON 2 MG/ML
INJECTION INTRAMUSCULAR; INTRAVENOUS AS NEEDED
Status: DISCONTINUED | OUTPATIENT
Start: 2019-08-02 | End: 2019-08-02 | Stop reason: SURG

## 2019-08-02 RX ADMIN — EPHEDRINE SULFATE 5 MG: 50 INJECTION, SOLUTION INTRAVENOUS at 08:56

## 2019-08-02 RX ADMIN — CIPROFLOXACIN 400 MG: 2 INJECTION, SOLUTION INTRAVENOUS at 08:20

## 2019-08-02 RX ADMIN — FENTANYL CITRATE 25 MCG: 50 INJECTION, SOLUTION INTRAMUSCULAR; INTRAVENOUS at 08:55

## 2019-08-02 RX ADMIN — LIDOCAINE HYDROCHLORIDE 50 MG: 10 INJECTION, SOLUTION INFILTRATION; PERINEURAL at 08:55

## 2019-08-02 RX ADMIN — DEXAMETHASONE SODIUM PHOSPHATE 5 MG: 10 INJECTION, SOLUTION INTRAMUSCULAR; INTRAVENOUS at 09:01

## 2019-08-02 RX ADMIN — SODIUM CHLORIDE, SODIUM LACTATE, POTASSIUM CHLORIDE, AND CALCIUM CHLORIDE 125 ML/HR: .6; .31; .03; .02 INJECTION, SOLUTION INTRAVENOUS at 08:03

## 2019-08-02 RX ADMIN — ONDANSETRON 4 MG: 2 INJECTION INTRAMUSCULAR; INTRAVENOUS at 09:01

## 2019-08-02 RX ADMIN — EPHEDRINE SULFATE 10 MG: 50 INJECTION, SOLUTION INTRAVENOUS at 09:02

## 2019-08-02 RX ADMIN — EPHEDRINE SULFATE 5 MG: 50 INJECTION, SOLUTION INTRAVENOUS at 09:00

## 2019-08-02 RX ADMIN — PROPOFOL 130 MG: 10 INJECTION, EMULSION INTRAVENOUS at 08:56

## 2019-08-02 RX ADMIN — CIPROFLOXACIN: 2 INJECTION, SOLUTION INTRAVENOUS at 08:35

## 2019-08-02 RX ADMIN — LIDOCAINE HYDROCHLORIDE 0.5 ML: 10 INJECTION, SOLUTION EPIDURAL; INFILTRATION; INTRACAUDAL; PERINEURAL at 08:03

## 2019-08-02 NOTE — PROGRESS NOTES
Pt noted to have a 3 second time framet of 2 dropped QRS no sign or symptoms noted// Dr Mariel Myles notified and no new orders written

## 2019-08-02 NOTE — INTERVAL H&P NOTE
H&P reviewed  After examining the patient I find no changes in the patients condition since the H&P had been written  /64   Pulse (!) 50   Temp 97 5 °F (36 4 °C) (Tympanic Core)   Resp 19   SpO2 100%   Will proceed as planned with right ESWL

## 2019-08-02 NOTE — DISCHARGE INSTRUCTIONS
May use over the counter ibuprofen as needed for mild-moderate pain/discomfort  Drink plenty of water around the clock  Lithotripsy    · Urine may be bloody for 2-3 days  Please call your provider if your urine becomes increasingly red with large clots  · You may see redness over the skin on your back where the kidney stone was  You may apply heat 20-30 minutes every 2 hours if desired  Take precautions while using a heating pad  · Avoid any heavy lifting or straining for one week  You may resume normal activities including driving after 24 hours  · Avoid intercourse for one week  · You may shower or sit in a warm tub of water for comfort after 24 hours  · You may be given a strainer to strain your urine and a specimen cup to collect any stone fragments  You can bring any fragments collected to your next office visit  · You may experience urethral burning with urination  Call your provider if lasting longer than 2 days  · Call your provider if you experience severe pain, inability to urinate, you feel as though your bladder is not emptying fully, are leaking urine, have a fever (over 100 3 orally), or chills

## 2019-08-02 NOTE — OP NOTE
OPERATIVE REPORT  PATIENT NAME: Andreina Barlow    :  1950  MRN: [id number]  Pt Location:  CYSTO ROOM 02    SURGERY DATE: 2019    Surgeon(s) and Role:     * Brooke Das MD - Primary    Preop Diagnosis:  Nephrolithiasis [N20 0]    Post-Op Diagnosis Codes:     * Nephrolithiasis [N20 0]    Procedure(s) (LRB):  LITHROTRIPSY EXTRACORPORAL SHOCKWAVE (ESWL) (Right)    Specimen(s):  * No specimens in log *    Estimated Blood Loss:   Minimal    Drains:  * No LDAs found *    Anesthesia Type:   General    Operative Indications:  Nephrolithiasis [N20 0]      Operative Findings:  Stone well fragmented    Complications:   None    Procedure and Technique:  The patient was identified, brought to the operating room, and placed on the table in supine position  The stone was localized in the right UPJ area with biplanar fluoroscopy  After induction of general anesthesia, a formal timeout was performed  ESWL commenced in escalating son for a total of 3000 shocks  The stone appeared to be well-fragmented  The patient was awoken from anesthesia and transferred to the PACU awake, alert, and in stable condition         I was present for the entire procedure    Patient Disposition:  PACU     SIGNATURE: Brooke Das MD  DATE: 2019  TIME: 9:30 AM

## 2019-08-02 NOTE — ANESTHESIA PREPROCEDURE EVALUATION
Review of Systems/Medical History  Patient summary reviewed  Chart reviewed  No history of anesthetic complications     Cardiovascular  Exercise tolerance (METS): >4,    Comment: Sinus bradycardia with 1st degree A-V block  Otherwise normal ECG,  Pulmonary       GI/Hepatic    Chronic liver disease,        Kidney stones,        Endo/Other     GYN       Hematology   Musculoskeletal       Neurology  Seizures (last attack 22 years ago) well controlled,     Psychology           Physical Exam    Airway    Mallampati score: II  TM Distance: >3 FB  Neck ROM: full     Dental   No notable dental hx     Cardiovascular  Cardiovascular exam normal    Pulmonary  Pulmonary exam normal     Other Findings        Anesthesia Plan  ASA Score- 2     Anesthesia Type- general with ASA Monitors  Additional Monitors:   Airway Plan: LMA  Plan Factors-    Induction- intravenous  Postoperative Plan- Plan for postoperative opioid use  Informed Consent- Anesthetic plan and risks discussed with patient  I personally reviewed this patient with the CRNA  Discussed and agreed on the Anesthesia Plan with the CRNA  Hamida Gramajo

## 2019-08-02 NOTE — ANESTHESIA POSTPROCEDURE EVALUATION
Post-Op Assessment Note    CV Status:  Stable  Pain Score: 0    Pain management: adequate     Mental Status:  Alert and awake   Hydration Status:  Euvolemic   PONV Controlled:  Controlled   Airway Patency:  Patent   Post Op Vitals Reviewed: Yes      Staff: CRNA           /59 (08/02/19 0955)    Temp   97 2   Pulse (!) 45 (08/02/19 0955)   Resp 21 (08/02/19 0955)    SpO2 100 % (08/02/19 0955)

## 2019-08-05 ENCOUNTER — TELEPHONE (OUTPATIENT)
Dept: UROLOGY | Facility: AMBULATORY SURGERY CENTER | Age: 69
End: 2019-08-05

## 2019-08-05 DIAGNOSIS — N20.0 NEPHROLITHIASIS: Primary | ICD-10-CM

## 2019-08-05 NOTE — TELEPHONE ENCOUNTER
Post Op Note    Crystal Thorne is a 80 y o  female s/p LITHROTRIPSY EXTRACORPORAL SHOCKWAVE (ESWL) (Right Ureter) performed 8/2/19  Crystal Thorne is a patient of Dr Alana Panchal and is seen at the [place] office  Left message for patient to return call to find out how she is doing since surgery last week and to schedule follow up  Per Dr Alana Panchal patient should follow up in 1 month with THERESE GONZALEZ prior to appointment  KUB order placed

## 2019-08-06 NOTE — TELEPHONE ENCOUNTER
Patient returned call to clinical   I tried to schedule her for 1 month followup and she wants to know why 1 month, not sooner

## 2019-08-06 NOTE — TELEPHONE ENCOUNTER
Called and spoke to patient  Explained to patient that they usually like patient to follow up 1 month after her procedure with KUB  Made her aware that this is the typical time frame because they do not want to repeat imaging to soon  Patient reports that he is feeling well with not hematuria or pain or any concerns  Patient was scheduled for a follow up with Troy Lerner at the [place] office and she is aware of time and location of appointment  Patient is aware to have KUB done prior to appointment

## 2019-08-28 ENCOUNTER — TRANSCRIBE ORDERS (OUTPATIENT)
Dept: RADIOLOGY | Facility: HOSPITAL | Age: 69
End: 2019-08-28

## 2019-08-28 ENCOUNTER — HOSPITAL ENCOUNTER (OUTPATIENT)
Dept: RADIOLOGY | Facility: HOSPITAL | Age: 69
Discharge: HOME | End: 2019-08-28
Attending: UROLOGY
Payer: COMMERCIAL

## 2019-08-28 DIAGNOSIS — N20.0 NEPHROLITHIASIS: ICD-10-CM

## 2019-08-28 PROCEDURE — 74018 RADEX ABDOMEN 1 VIEW: CPT

## 2019-09-09 ENCOUNTER — OFFICE VISIT (OUTPATIENT)
Dept: UROLOGY | Facility: AMBULATORY SURGERY CENTER | Age: 69
End: 2019-09-09

## 2019-09-09 VITALS
BODY MASS INDEX: 17.99 KG/M2 | WEIGHT: 108 LBS | HEIGHT: 65 IN | DIASTOLIC BLOOD PRESSURE: 52 MMHG | SYSTOLIC BLOOD PRESSURE: 110 MMHG | HEART RATE: 56 BPM

## 2019-09-09 DIAGNOSIS — N20.0 KIDNEY STONES: ICD-10-CM

## 2019-09-09 PROCEDURE — 99024 POSTOP FOLLOW-UP VISIT: CPT | Performed by: NURSE PRACTITIONER

## 2019-09-09 NOTE — PROGRESS NOTES
9/9/2019      Chief Complaint   Patient presents with    Nephrolithiasis     KUB 8/28/19     Assessment and Plan    80 y o  female managed by Dr Joanne Ledezma    1  Nephrolithiasis  · S/p ESWL 8-2-2019  · KUB performed 8-28-19 shows small left sided renal stones, right sided stones no present   · Repeat KUB in 1 year  · Continue to monitor urinary symptoms  · Dietary and  Behavioral modifications discussed  · Kidney stone handout provided    History of Present Illness  Sylwia Serrano is a 80 y o  female here for follow up evaluation of  nephrolithiasis status post ESWL 08/02/2019  Patient with a KUB performed 08/28/2019 shows small left-sided renal calculi with no stones noted on the right  She currently has complaints of intermittent left lower quadrant abdominal "twinges of discomfort" which resolve spontaneously  Patient reports eating a significant amount of cheese per day to assist in her ability to gain weight  She currently has no urinary complaints  Review of Systems   Constitutional: Negative for chills and fever  Respiratory: Negative for cough and shortness of breath  Cardiovascular: Negative for chest pain  Gastrointestinal: Negative for abdominal distention, abdominal pain, blood in stool, nausea and vomiting  Genitourinary: Negative for difficulty urinating, dysuria, enuresis, flank pain, frequency, hematuria and urgency  Skin: Negative for rash  Urinary Incontinence Screening      Most Recent Value   Urinary Incontinence   Urinary Incontinence? No   Incomplete emptying? No   Urinary frequency? No   Urinary urgency? No   Urinary hesitancy? No   Dysuria (painful difficult urination)? No   Nocturia (waking up to use the bathroom)? No   Straining (having to push to go)? No   Weak stream?  No   Intermittent stream?  No   Post void dribbling?   No        Past Medical History  Past Medical History:   Diagnosis Date    Epilepsy ([facility] [address] )     petite mal - last seizure approx 23 yrs age   Susanna Moreira History of bilateral breast implants     Kidney stone     left side    Lyme disease     2016     Past Social History  Past Surgical History:   Procedure Laterality Date    BREAST SURGERY      Augmentation with breast implants    COLONOSCOPY      FL RETROGRADE PYELOGRAM  5/8/2019    FL RETROGRADE PYELOGRAM  5/28/2019    FL RETROGRADE PYELOGRAM  7/9/2019    [place] CYSTO/URETERO W/LITHOTRIPSY &INDWELL STENT INSRT Left 5/8/2019    Procedure: CYSTOSCOPY; RETROGRADE PYELOGRAM AND INSERTION STENT URETERAL;  Surgeon: Genevieve Acevedo MD;  [facility];  Service: Urology    SC CYSTO/URETERO W/LITHOTRIPSY &INDWELL STENT INSRT Left 5/28/2019    Procedure: CYSTOSCOPY URETEROSCOPY WITH LITHOTRIPSY HOLMIUM LASER, RETROGRADE PYELOGRAM AND INSERTION STENT URETERAL;  Surgeon: Genevieve Acevedo MD;  [facility];  Service: Urology    SC CYSTO/URETERO W/LITHOTRIPSY &INDWELL STENT INSRT Right 7/9/2019    Procedure: CYSTOSCOPY URETEROSCOPY WITH RETROGRADE PYELOGRAM AND INSERTION STENT URETERAL;  Surgeon: Genevieve Acevedo MD;  [facility];  Service: Urology    Mell ESWL Right 8/2/2019    Procedure: Joslyn Rinne SHOCKWAVE (ESWL);   Surgeon: Genevieve Acevedo MD;  [facility];  Service: Urology    ROTATOR CUFF REPAIR Right     "Open"    WISDOM TOOTH EXTRACTION       Social History     Tobacco Use   Smoking Status Never Smoker   Smokeless Tobacco Never Used     Past Family History  Family History   Problem Relation Age of Onset    Stroke Father     Ovarian cancer Maternal Aunt      Past Social history  Social History     Socioeconomic History    Marital status: /Civil Union     Spouse name: Not on file    Number of children: Not on file    Years of education: Not on file    Highest education level: Not on file   Occupational History    Occupation: , , educator   Social Needs    Financial resource strain: Not on file    Food insecurity:     Worry: Not on file     Inability: Not on file    Transportation needs:     Medical: Not on file     Non-medical: Not on file   Tobacco Use    Smoking status: Never Smoker    Smokeless tobacco: Never Used   Substance and Sexual Activity    Alcohol use: Yes     Frequency: 4 or more times a week     Drinks per session: 1 or 2     Binge frequency: Never     Comment: 1 drink per night bourbon    Drug use: No    Sexual activity: Not on file   Lifestyle    Physical activity:     Days per week: Not on file     Minutes per session: Not on file    Stress: Not on file   Relationships    Social connections:     Talks on phone: Not on file     Gets together: Not on file     Attends Holiness service: Not on file     Active member of club or organization: Not on file     Attends meetings of clubs or organizations: Not on file     Relationship status: Not on file    Intimate partner violence:     Fear of current or ex partner: Not on file     Emotionally abused: Not on file     Physically abused: Not on file     Forced sexual activity: Not on file   Other Topics Concern    Not on file   Social History Narrative    Not on file       Current Medications  Current Outpatient Medications   Medication Sig Dispense Refill    aspirin (ECOTRIN LOW STRENGTH) 81 mg EC tablet Take 81 mg by mouth daily in the early morning      cholecalciferol (VITAMIN D3) 1,000 units tablet Take 2,000 Units by mouth daily      estradiol-norethindrone (COMBIPATCH) 0 05-0 14 MG/DAY Place 1 patch on the skin 2 (two) times a week      Omega-3 Fatty Acids ([employer] FISH OIL) 1000 MG CAPS Take by mouth daily       sertraline (ZOLOFT) 25 mg tablet Take 25 mg by mouth daily in the early morning        No current facility-administered medications for this visit          Allergies  Allergies   Allergen Reactions    Crestor [Rosuvastatin] Hives    Zocor [Simvastatin] Hives    Cephalexin Rash    Ezetimibe Rash  Penicillins Rash    Pravastatin Rash     The following portions of the patient's history were reviewed and updated as appropriate: allergies, current medications, past medical history, past social history, past surgical history and problem list     Vitals  Vitals:    09/09/19 1440   BP: 110/52   BP Location: Left arm   Patient Position: Sitting   Cuff Size: Adult   Pulse: 56   Weight: 49 kg (108 lb)   Height: 5' 4 5" (1 638 m)     Physical Exam  Physical Exam   Constitutional: She is oriented to person, place, and time  She appears well-developed and well-nourished  HENT:   Head: Atraumatic  Eyes: EOM are normal    Pulmonary/Chest: Effort normal  No respiratory distress  Abdominal: There is no CVA tenderness  Musculoskeletal: Normal range of motion  Neurological: She is alert and oriented to person, place, and time  Skin: Skin is warm and dry  Results  No results found for this or any previous visit (from the past 1 hour(s))  ]  No results found for: PSA  Lab Results   Component Value Date    CALCIUM 8 7 02/17/2017    K 3 8 02/17/2017    CO2 27 02/17/2017     02/17/2017    BUN 24 04/19/2019    CREATININE 0 85 04/19/2019     Lab Results   Component Value Date    WBC 7 78 02/17/2017    HGB 13 5 02/17/2017    HCT 39 4 02/17/2017    MCV 94 02/17/2017     02/17/2017     Orders  Orders Placed This Encounter   Procedures    XR abdomen 1 view kub     Nephrolithiasis     Standing Status:   Future     Standing Expiration Date:   9/9/2023     Scheduling Instructions:      Bring along any outside films relating to this procedure               PATRIZIA Florez

## 2019-09-09 NOTE — PATIENT INSTRUCTIONS
Dietary and behavioral modifications as discussed  Continue to maintain hydration  Call the office for concerning issues or concerns

## 2019-09-25 ENCOUNTER — TRANSCRIBE ORDERS (OUTPATIENT)
Dept: PHYSICAL THERAPY | Facility: REHABILITATION | Age: 69
End: 2019-09-25

## 2019-09-25 ENCOUNTER — EVALUATION (OUTPATIENT)
Dept: PHYSICAL THERAPY | Facility: REHABILITATION | Age: 69
End: 2019-09-25
Payer: COMMERCIAL

## 2019-09-25 DIAGNOSIS — M25.511 RIGHT SHOULDER PAIN, UNSPECIFIED CHRONICITY: Primary | ICD-10-CM

## 2019-09-25 PROCEDURE — 97161 PT EVAL LOW COMPLEX 20 MIN: CPT | Performed by: PHYSICAL THERAPIST

## 2019-09-25 NOTE — LETTER
2019    MD Kay Cassidy [address] [place] [address]    Patient: Elva Holter   YOB: 1950   Date of Visit: 2019     Encounter Diagnosis     ICD-10-CM    1  Right shoulder pain, unspecified chronicity M25 511        Dear Dr Alicja Stover: Thank you for your recent referral of Elva Holter  Please review the attached evaluation summary from Deann's recent visit  Please verify that you agree with the plan of care by signing the attached order  If you have any questions or concerns, please do not hesitate to call  I sincerely appreciate the opportunity to share in the care of one of your patients and hope to have another opportunity to work with you in the near future  Sincerely,    Roderick Vasquez, PT      Referring Provider:      I certify that I have read the below Plan of Care and certify the need for these services furnished under this plan of treatment while under my care  MD Kay Cassidy [address]: [phone]          PT Evaluation     Today's date: 2019  Patient name: Elva Holter  : 1950  MRN: [id number]  Referring provider: Jesus Manuel Raines MD  Dx:   Encounter Diagnosis     ICD-10-CM    1  Right shoulder pain, unspecified chronicity M25 511        Start Time: 1405  Stop Time: 1505  Total time in clinic (min): 60 minutes    Assessment  Assessment details: Elva Holter is a 77 y o  female who presents with pain, decreased strength, decreased ROM and ambulatory dysfunction  Due to these impairments, Patient has difficulty performing a/iadls, recreational activities and engaging in social activities  Patient's clinical presentation is consistent with their referring diagnosis of right shoulder pain with possible labral pathology   Patient would benefit from skilled physical therapy to address their aforementioned impairments, improve their level of function and to improve their overall quality of life  Impairments: abnormal muscle firing, abnormal or restricted ROM, activity intolerance, impaired physical strength, lacks appropriate home exercise program, pain with function and poor posture   Understanding of Dx/Px/POC: excellent   Prognosis: good    Goals  Short Term Goals: to be achieved by 4 weeks  1) Patient to be independent with basic HEP  2) Decrease pain to 4/10 at it's worst   3) Increase UE strength by 1/2 MMT grade in all deficient planes  4) Increase UE ROM by > 5 deg in all deficient planes  5) Patient to report decreased sleep interruption secondary to pain  Long Term Goals: to be achieved by discharge  1) FOTO equal to or greater than TBD  2) Patient to be independent with comprehensive HEP  3) Abolish pain for improved quality of life  4) Increase UE strength to 5/5 MMT grade in all deficient planes to improve a/iadls  5) Increase UE ROM to within 5 deg of contralateral UE to improve a/iadls  6) Patient to report no sleep interruption secondary to pain  Plan  Patient would benefit from: skilled PT  Planned modality interventions: biofeedback, cryotherapy, hydrotherapy, unattended electrical stimulation, thermotherapy: hydrocollator packs and low level laser therapy  Planned therapy interventions: activity modification, ADL retraining, behavior modification, body mechanics training, functional ROM exercises, home exercise program, IADL retraining, joint mobilization, manual therapy, massage, neuromuscular re-education, patient education, postural training, strengthening, stretching, therapeutic activities and therapeutic exercise  Frequency: 1-2x week  Duration in weeks: 12  Treatment plan discussed with: patient        Subjective Evaluation    History of Present Illness  Mechanism of injury: Patient reports acute on chronic h/o right shoulder pain beginning greater than 20 years ago   Patient first injured her shoulder in a rollerblading accident  Patient underwent a right RC open repair 8 years ago  Patient developed significant stiffness following the surgery requiring 2 JOHN and 1 arthroscopic release  Patient's function and pain eventually returned to normal until she served a tennis ball overhead and tore the long head of her biceps resulting in retraction  Approximately 3 weeks ago she went to a tennis clinic which involved repetitive overhead hitting and she reinjured her shoulder  Patient took Ibuprofen for 2 weeks which partially helped to alleviate her pain  Patient now continues to have pain that interrupts her sleep, forcing her to sleep on her left side  Patient states that she intermittently has numbness in all 4 digits of her right hand  Patient denies experiencing crepitus  Patient has had no injections to date  Pain  Current pain ratin  At best pain ratin  At worst pain ratin  Location: right shoulder - diffuse  Quality: sore, achy  Alleviating factors: Ibuprofin, laying on back, ice  Exacerbated by: laying on left side, digging/gardening, raking leaves, playing tennis  Social Support    Employment status: working (Independent  ; [employer])  Hand dominance: right      Diagnostic Tests  X-ray: normal (Right shoulder: normal)  Patient Goals  Patient goal: decrease pain, improve sleep, return to tennis        Objective     Postural Observations  Seated posture: fair  Standing posture: fair    Additional Postural Observation Details  Right GHJ and scapular IR    Palpation     Right   Hypertonic in the pectoralis major and pectoralis minor  Tenderness of the pectoralis major and pectoralis minor  Tenderness     Right Shoulder  Tenderness in the bicipital groove       Neurological Testing     Sensation     Shoulder   Left Shoulder   Intact: light touch    Right Shoulder   Intact: light touch    Active Range of Motion   Left Shoulder   Flexion: 175 degrees Abduction: 180 degrees   External rotation BTH: T5   Internal rotation BTB: T4     Right Shoulder   Flexion: 155 degrees   Abduction: 165 degrees   External rotation BTH: T5   Internal rotation BTB: T4     Passive Range of Motion   Left Shoulder   Flexion: 175 degrees   Abduction: 180 degrees   External rotation 90°:  100 degrees   Internal rotation 90°:  45 degrees     Right Shoulder   Flexion: 170 degrees   Abduction: 170 degrees   External rotation 90°:  90 degrees   Internal rotation 90°:  50 degrees     Strength/Myotome Testing     Left Shoulder     Planes of Motion   Flexion: 4+   Extension: 4+   External rotation at 0°:  5   Internal rotation at 0°:  5     Isolated Muscles   Middle trapezius: 5   Upper trapezius: 5     Right Shoulder     Planes of Motion   Flexion: 4   Extension: 4   External rotation at 0°:  5   Internal rotation at 0°:  5     Isolated Muscles   Middle trapezius: 4-   Upper trapezius: 4-     Left Elbow   Flexion: 5  Extension: 5    Right Elbow   Flexion: 5  Extension: 5    Left Wrist/Hand   Wrist flexion: 5    Right Wrist/Hand   Wrist extension: 5  Wrist flexion: 5    Tests     Right Shoulder   Negative anterior load and shift, anterior slide, drop arm, lift-off, posterior load and shift, sulcus sign and anterior slide   Flowsheet Rows      Most Recent Value   PT/OT G-Codes   Current Score  58   Projected Score  68             Precautions: Lyme disease      Manual  9/25            Right shoulder PROM             Gr  II-III posterior capsule str  Pec minor release                                           Exercise Diary  9/25            UBE             Shoulder ext  ER with TB             BodyBlade at side             Blackburns (ext, h  Abd, scap)             Multidirectional ball stability on wall             ER str   At 90/90 against wall Modalities  9/25

## 2019-09-25 NOTE — PROGRESS NOTES
PT Evaluation     Today's date: 2019  Patient name: Sylwia Serrano  : 1950  MRN: [id number]  Referring provider: Jill Ortega MD  Dx:   Encounter Diagnosis     ICD-10-CM    1  Right shoulder pain, unspecified chronicity M25 511        Start Time: 1405  Stop Time: 1505  Total time in clinic (min): 60 minutes    Assessment  Assessment details: Sylwia Serrano is a 77 y o  female who presents with pain, decreased strength, decreased ROM and ambulatory dysfunction  Due to these impairments, Patient has difficulty performing a/iadls, recreational activities and engaging in social activities  Patient's clinical presentation is consistent with their referring diagnosis of right shoulder pain with possible labral pathology  Patient would benefit from skilled physical therapy to address their aforementioned impairments, improve their level of function and to improve their overall quality of life  Impairments: abnormal muscle firing, abnormal or restricted ROM, activity intolerance, impaired physical strength, lacks appropriate home exercise program, pain with function and poor posture   Understanding of Dx/Px/POC: excellent   Prognosis: good    Goals  Short Term Goals: to be achieved by 4 weeks  1) Patient to be independent with basic HEP  2) Decrease pain to 4/10 at it's worst   3) Increase UE strength by 1/2 MMT grade in all deficient planes  4) Increase UE ROM by > 5 deg in all deficient planes  5) Patient to report decreased sleep interruption secondary to pain  Long Term Goals: to be achieved by discharge  1) FOTO equal to or greater than TBD  2) Patient to be independent with comprehensive HEP  3) Abolish pain for improved quality of life  4) Increase UE strength to 5/5 MMT grade in all deficient planes to improve a/iadls  5) Increase UE ROM to within 5 deg of contralateral UE to improve a/iadls  6) Patient to report no sleep interruption secondary to pain      Plan  Patient would benefit from: skilled PT  Planned modality interventions: biofeedback, cryotherapy, hydrotherapy, unattended electrical stimulation, thermotherapy: hydrocollator packs and low level laser therapy  Planned therapy interventions: activity modification, ADL retraining, behavior modification, body mechanics training, functional ROM exercises, home exercise program, IADL retraining, joint mobilization, manual therapy, massage, neuromuscular re-education, patient education, postural training, strengthening, stretching, therapeutic activities and therapeutic exercise  Frequency: 1-2x week  Duration in weeks: 12  Treatment plan discussed with: patient        Subjective Evaluation    History of Present Illness  Mechanism of injury: Patient reports acute on chronic h/o right shoulder pain beginning greater than 20 years ago  Patient first injured her shoulder in a rollerblading accident  Patient underwent a right RC open repair 8 years ago  Patient developed significant stiffness following the surgery requiring 2 JOHN and 1 arthroscopic release  Patient's function and pain eventually returned to normal until she served a tennis ball overhead and tore the long head of her biceps resulting in retraction  Approximately 3 weeks ago she went to a tennis clinic which involved repetitive overhead hitting and she reinjured her shoulder  Patient took Ibuprofen for 2 weeks which partially helped to alleviate her pain  Patient now continues to have pain that interrupts her sleep, forcing her to sleep on her left side  Patient states that she intermittently has numbness in all 4 digits of her right hand  Patient denies experiencing crepitus  Patient has had no injections to date  Pain  Current pain ratin  At best pain ratin  At worst pain ratin  Location: right shoulder - diffuse  Quality: sore, achy  Alleviating factors: Ibuprofin, laying on back, ice    Exacerbated by: laying on left side, digging/gardening, raking leaves, playing tennis  Social Support    Employment status: working (Independent  ; [employer])  Hand dominance: right      Diagnostic Tests  X-ray: normal (Right shoulder: normal)  Patient Goals  Patient goal: decrease pain, improve sleep, return to tennis        Objective     Postural Observations  Seated posture: fair  Standing posture: fair    Additional Postural Observation Details  Right GHJ and scapular IR    Palpation     Right   Hypertonic in the pectoralis major and pectoralis minor  Tenderness of the pectoralis major and pectoralis minor  Tenderness     Right Shoulder  Tenderness in the bicipital groove       Neurological Testing     Sensation     Shoulder   Left Shoulder   Intact: light touch    Right Shoulder   Intact: light touch    Active Range of Motion   Left Shoulder   Flexion: 175 degrees   Abduction: 180 degrees   External rotation BTH: T5   Internal rotation BTB: T4     Right Shoulder   Flexion: 155 degrees   Abduction: 165 degrees   External rotation BTH: T5   Internal rotation BTB: T4     Passive Range of Motion   Left Shoulder   Flexion: 175 degrees   Abduction: 180 degrees   External rotation 90°: 100 degrees   Internal rotation 90°: 45 degrees     Right Shoulder   Flexion: 170 degrees   Abduction: 170 degrees   External rotation 90°: 90 degrees   Internal rotation 90°: 50 degrees     Strength/Myotome Testing     Left Shoulder     Planes of Motion   Flexion: 4+   Extension: 4+   External rotation at 0°: 5   Internal rotation at 0°: 5     Isolated Muscles   Middle trapezius: 5   Upper trapezius: 5     Right Shoulder     Planes of Motion   Flexion: 4   Extension: 4   External rotation at 0°: 5   Internal rotation at 0°: 5     Isolated Muscles   Middle trapezius: 4-   Upper trapezius: 4-     Left Elbow   Flexion: 5  Extension: 5    Right Elbow   Flexion: 5  Extension: 5    Left Wrist/Hand   Wrist flexion: 5    Right Wrist/Hand   Wrist extension: 5  Wrist flexion: 5    Tests Right Shoulder   Negative anterior load and shift, anterior slide, drop arm, lift-off, posterior load and shift, sulcus sign and anterior slide   Flowsheet Rows      Most Recent Value   PT/OT G-Codes   Current Score  58   Projected Score  68             Precautions: Lyme disease      Manual  9/25            Right shoulder PROM             Gr  II-III posterior capsule str  Pec minor release                                           Exercise Diary  9/25            UBE             Shoulder ext  ER with TB             BodyBlade at side             Blackburns (ext, h  Abd, scap)             Multidirectional ball stability on wall             ER str   At 90/90 against wall                                                                                                                                                                                          Modalities  9/25

## 2019-09-26 ENCOUNTER — OFFICE VISIT (OUTPATIENT)
Dept: PHYSICAL THERAPY | Facility: REHABILITATION | Age: 69
End: 2019-09-26
Payer: COMMERCIAL

## 2019-09-26 DIAGNOSIS — M25.511 RIGHT SHOULDER PAIN, UNSPECIFIED CHRONICITY: Primary | ICD-10-CM

## 2019-09-26 PROCEDURE — 97110 THERAPEUTIC EXERCISES: CPT | Performed by: PHYSICAL THERAPIST

## 2019-09-26 PROCEDURE — 97140 MANUAL THERAPY 1/> REGIONS: CPT | Performed by: PHYSICAL THERAPIST

## 2019-09-26 NOTE — PROGRESS NOTES
Daily Note     Today's date: 2019  Patient name: Elva Holter  : 1950  MRN: [id number]  Referring provider: Jesus Manuel Raines MD  Dx:   Encounter Diagnosis     ICD-10-CM    1  Right shoulder pain, unspecified chronicity M25 511        Start Time: 805  Stop Time: 903  Total time in clinic (min): 58 minutes    Subjective: Patient reports no significant changes to overall status since yesterday's IE  Objective: See treatment diary below      Assessment: Tolerated treatment well  Patient demonstrated fatigue post treatment, exhibited good technique with therapeutic exercises and would benefit from continued PT  Patient with less tenderness to right pec minor tendon  Patient with good tolerance for manual stretching in all planes  Patient reporting increased muscular soreness at end of session  Plan: Continue per plan of care  Progress treatment as tolerated  Precautions: Lyme disease      Manual             Right shoulder PROM  GR           Gr  II-III posterior capsule str  GR           Pec minor release  GR                                         Exercise Diary             UBE  3' / 3'           Shoulder ext  XS Purple 2x10 3"           ER with TB  2x10 OTB           BodyBlade at side             Blackburns (ext, h  Abd, scap)             Multidirectional ball stability on wall             ER str  At 90/90 against wall  5x20"           Foam roll protocol  1' ea                                                                                                                                                                             Modalities

## 2019-10-01 ENCOUNTER — OFFICE VISIT (OUTPATIENT)
Dept: PHYSICAL THERAPY | Facility: REHABILITATION | Age: 69
End: 2019-10-01
Payer: COMMERCIAL

## 2019-10-01 DIAGNOSIS — M25.511 RIGHT SHOULDER PAIN, UNSPECIFIED CHRONICITY: Primary | ICD-10-CM

## 2019-10-01 PROCEDURE — 97140 MANUAL THERAPY 1/> REGIONS: CPT | Performed by: PHYSICAL THERAPIST

## 2019-10-01 PROCEDURE — 97112 NEUROMUSCULAR REEDUCATION: CPT | Performed by: PHYSICAL THERAPIST

## 2019-10-01 PROCEDURE — 97110 THERAPEUTIC EXERCISES: CPT | Performed by: PHYSICAL THERAPIST

## 2019-10-01 NOTE — PROGRESS NOTES
Daily Note     Today's date: 10/1/2019  Patient name: Joshua Irwin  : 1950  MRN: [id number]  Referring provider: Ghislaine Callejas MD  Dx:   Encounter Diagnosis     ICD-10-CM    1  Right shoulder pain, unspecified chronicity M25 511        Start Time: 0805  Stop Time: 0900  Total time in clinic (min): 55 minutes    Subjective: Patient reports that she has performed her HEP once since her previous treatment  Patient feels sore overall  Objective: See treatment diary below      Assessment: Tolerated treatment well  Patient demonstrated fatigue post treatment and would benefit from continued PT  Patient demonstrated fair independence and understanding of current HEP, requiring intermittent verbal and visual cueing to perform correctly  Plan: Continue per plan of care  Progress treatment as tolerated  Precautions: Lyme disease      Manual  9/25 9/26 10/1          Right shoulder PROM  GR GR          Gr  II-III posterior capsule str  GR GR          Pec minor release  GR           Multidirectional rhythmic stabilization (60, 90, 120 deg FF)   GR                           Exercise Diary  9/25 9/26 10/1          UBE  3' / 3' 3' / 3'          Shoulder ext  XS Purple 2x10 3" XS Purple 2x10 3"          ER with TB  2x10 OTB 2x10 OTB          BodyBlade at side   5x30"          Blackburns (ext, h  Abd, scap)             Multidirectional ball stability on wall   20x ea  ER str  At 90/90 against wall  5x20" 2x30"          Foam roll protocol  1' ea                                                                                                                                                                             Modalities  9/25 9/26 10/1

## 2019-10-03 ENCOUNTER — OFFICE VISIT (OUTPATIENT)
Dept: PHYSICAL THERAPY | Facility: REHABILITATION | Age: 69
End: 2019-10-03
Payer: COMMERCIAL

## 2019-10-03 DIAGNOSIS — M25.511 RIGHT SHOULDER PAIN, UNSPECIFIED CHRONICITY: Primary | ICD-10-CM

## 2019-10-03 PROCEDURE — 97110 THERAPEUTIC EXERCISES: CPT | Performed by: PHYSICAL THERAPIST

## 2019-10-03 PROCEDURE — 97112 NEUROMUSCULAR REEDUCATION: CPT | Performed by: PHYSICAL THERAPIST

## 2019-10-03 NOTE — PROGRESS NOTES
Daily Note     Today's date: 10/3/2019  Patient name: Joshua Irwin  : 1950  MRN: [id number]  Referring provider: Ghislaine Callejas MD  Dx:   Encounter Diagnosis     ICD-10-CM    1  Right shoulder pain, unspecified chronicity M25 511        Start Time: 1003  Stop Time: 1100  Total time in clinic (min): 57 minutes    Subjective: Patient reports that she has been having to take Ibuprofen for right shoulder discomfort following her pec stretch  Objective: See treatment diary below      Assessment: Tolerated treatment well  Patient demonstrated fatigue post treatment, exhibited good technique with therapeutic exercises and would benefit from continued PT  Patient continues to be tender to palpation over right pec minor tendon  Patient requires frequent verbal cueing to perform correct repetitions  Plan: Continue per plan of care  Progress treatment as tolerated  Precautions: Lyme disease      Manual  9/25 9/26 10/1 10/3         Right shoulder PROM  GR GR Deferred         Gr  II-III posterior capsule str  GR GR Deferred         Pec minor release  GR           Multidirectional rhythmic stabilization (60, 90, 120 deg FF)   GR Deferred                          Exercise Diary  9/25 9/26 10/1 10/3         UBE  3' / 3' 3' / 3' 3' / 3'         Shoulder ext  XS Purple 2x10 3" XS Purple 2x10 3"          ER with TB  2x10 OTB 2x10 OTB          BodyBlade at side   5x30" 5x30"         Prone shoulder ext, h  Abd, scaption    15x ea  Multidirectional ball stability on wall   20x ea  20x ea  ER str  At 90/90 against wall  5x20" 2x30" 2x30"         Foam roll protocol  1' ea             Pec minor release with lax ball against wall     3'         No money                                                                                                                                                   Modalities  9/25 9/26 10/1 10/3

## 2019-10-08 ENCOUNTER — OFFICE VISIT (OUTPATIENT)
Dept: PHYSICAL THERAPY | Facility: REHABILITATION | Age: 69
End: 2019-10-08
Payer: COMMERCIAL

## 2019-10-08 DIAGNOSIS — M25.511 RIGHT SHOULDER PAIN, UNSPECIFIED CHRONICITY: Primary | ICD-10-CM

## 2019-10-08 PROCEDURE — 97112 NEUROMUSCULAR REEDUCATION: CPT | Performed by: PHYSICAL THERAPIST

## 2019-10-08 PROCEDURE — 97110 THERAPEUTIC EXERCISES: CPT | Performed by: PHYSICAL THERAPIST

## 2019-10-08 NOTE — PROGRESS NOTES
Daily Note     Today's date: 10/8/2019  Patient name: Carlie Chapman  : 1950  MRN: [id number]  Referring provider: Juan Rutledge MD  Dx:   Encounter Diagnosis     ICD-10-CM    1  Right shoulder pain, unspecified chronicity M25 511        Start Time: 805  Stop Time: 900  Total time in clinic (min): 55 minutes    Subjective: Patient reports that she is able to lay on her back to sleep with less limitation and is able to do her pec stretch with less pain  Objective: See treatment diary below      Assessment: Tolerated treatment well  Patient demonstrated fatigue post treatment, exhibited good technique with therapeutic exercises and would benefit from continued PT  Patient continues to progress well per POC with decreased pain and soreness at end of session  Plan: Continue per plan of care  Progress treatment as tolerated  Precautions: Lyme disease      Manual  9/25 9/26 10/1 10/3 10/8        Right shoulder PROM  GR GR Deferred         Gr  II-III posterior capsule str  GR GR Deferred         Pec minor release  GR           Multidirectional rhythmic stabilization (60, 90, 120 deg FF)   GR Deferred                          Exercise Diary  9/25 9/26 10/1 10/3 10/8        UBE  3' / 3' 3' / 3' 3' / 3' 3' / 3'        Shoulder ext  XS Purple 2x10 3" XS Purple 2x10 3"          ER with TB  2x10 OTB 2x10 OTB          BodyBlade at side   5x30" 5x30"         Prone shoulder ext, h  Abd, scaption    15x ea  2x10 2# ea  Multidirectional ball stability on wall   20x ea  20x ea  ER str  At 90/90 against wall  5x20" 2x30" 2x30"         Foam roll protocol  1' ea  1' ea          Pec minor release with lax ball against wall     3' Prone 3'        No money     2x10 5" OTB        PNF D2 UE flex     2x10 OTB                                                                                                                                  Modalities  9/25 9/26 10/1 10/3 10/8

## 2019-10-10 ENCOUNTER — OFFICE VISIT (OUTPATIENT)
Dept: PHYSICAL THERAPY | Facility: REHABILITATION | Age: 69
End: 2019-10-10
Payer: COMMERCIAL

## 2019-10-10 DIAGNOSIS — M25.511 RIGHT SHOULDER PAIN, UNSPECIFIED CHRONICITY: Primary | ICD-10-CM

## 2019-10-10 PROCEDURE — 97112 NEUROMUSCULAR REEDUCATION: CPT

## 2019-10-10 PROCEDURE — 97140 MANUAL THERAPY 1/> REGIONS: CPT

## 2019-10-10 PROCEDURE — 97110 THERAPEUTIC EXERCISES: CPT

## 2019-10-10 NOTE — PROGRESS NOTES
Daily Note     Today's date: 10/10/2019  Patient name: Alexa Sanders  : 1950  MRN: [id number]  Referring provider: Maximiliano Bustillo MD  Dx:   Encounter Diagnosis     ICD-10-CM    1  Right shoulder pain, unspecified chronicity M25 511        Start Time: 805  Stop Time: 08  Total time in clinic (min): 47 minutes    Subjective: Patient reports that the only time her R shoulder bothers her is at night         Objective: See treatment diary below      Assessment: Tolerated treatment well  Pt able to tolerate progression with reps this session  Added standing T's  Patient demonstrated fatigue post treatment, exhibited good technique with therapeutic exercises and would benefit from continued PT  Plan: Continue per plan of care  Progress treatment as tolerated  Precautions: Lyme disease      Manual  9/25 9/26 10/1 10/3 10/8 10/10       Right shoulder PROM  GR GR Deferred         Gr  II-III posterior capsule str  GR GR Deferred         Pec minor release  GR           Multidirectional rhythmic stabilization (60, 90, 120 deg FF)   GR Deferred  90 degrees 3#KB    8'                         Exercise Diary  9/25 9/26 10/1 10/3 10/8 10/10       UBE  3' / 3' 3' / 3' 3' / 3' 3' / 3' 3'/3'       Shoulder ext  XS Purple 2x10 3" XS Purple 2x10 3"          ER with TB  2x10 OTB 2x10 OTB          BodyBlade at side   5x30" 5x30"         Prone shoulder ext, h  Abd, scaption    15x ea  2x10 2# ea  2x10 2# ea  Multidirectional ball stability on wall   20x ea  20x ea  ER str  At 90/90 against wall  5x20" 2x30" 2x30"         Foam roll protocol  1' ea  1' ea   1' ea       Pec minor release with lax ball against wall     3' Prone 3' Prone 3'       No money     2x10 5" OTB 3x10  5"  OTB       PNF D2 UE flex     2x10 OTB  3x10  OTB       Standing T's      [place]  2x10                                                                                                                   Modalities   10/1 10/3 10/8

## 2019-10-15 ENCOUNTER — OFFICE VISIT (OUTPATIENT)
Dept: PHYSICAL THERAPY | Facility: REHABILITATION | Age: 69
End: 2019-10-15
Payer: COMMERCIAL

## 2019-10-15 DIAGNOSIS — M25.511 RIGHT SHOULDER PAIN, UNSPECIFIED CHRONICITY: Primary | ICD-10-CM

## 2019-10-15 PROCEDURE — 97140 MANUAL THERAPY 1/> REGIONS: CPT | Performed by: PHYSICAL THERAPIST

## 2019-10-15 PROCEDURE — 97112 NEUROMUSCULAR REEDUCATION: CPT | Performed by: PHYSICAL THERAPIST

## 2019-10-15 PROCEDURE — 97110 THERAPEUTIC EXERCISES: CPT | Performed by: PHYSICAL THERAPIST

## 2019-10-15 NOTE — PROGRESS NOTES
Daily Note     Today's date: 10/15/2019  Patient name: Andry Santa  : 1950  MRN: [id number]  Referring provider: Cecy Porter MD  Dx:   Encounter Diagnosis     ICD-10-CM    1  Right shoulder pain, unspecified chronicity M25 511        Start Time: 08  Stop Time: 0900  Total time in clinic (min): 48 minutes    Subjective: Patient reports that over the weekend she had increased pain following her previous treatment session and from cooking/cleaning to entertain guests  Today she has less pain  Patient is wondering if she needs an MRI to r/o internal derangement  Objective: See treatment diary below      Assessment: Tolerated treatment well  Patient demonstrated fatigue post treatment, exhibited good technique with therapeutic exercises and would benefit from continued PT  Significant portion of treatment session dedicated to Patient education regarding conservative vs surgical management of shoulder  Plan: Continue per plan of care  Progress treatment as tolerated  Precautions: Lyme disease      Manual  9/25 9/26 10/1 10/3 10/8 10/10 10/15      Right shoulder PROM  GR GR Deferred         Gr  II-III posterior capsule str  GR GR Deferred         Pec minor release  GR           Multidirectional rhythmic stabilization (60, 90, 120 deg FF)   GR Deferred  90 degrees 3#KB    8'  3# KB ea  GR                       Exercise Diary  9/25 9/26 10/1 10/3 10/8 10/10 10/15      UBE  3' / 3' 3' / 3' 3' / 3' 3' / 3' 3'/3' 3' / 3'      Shoulder ext  XS Purple 2x10 3" XS Purple 2x10 3"          ER with TB  2x10 OTB 2x10 OTB          BodyBlade at side   5x30" 5x30"         Prone shoulder ext, h  Abd, scaption    15x ea  2x10 2# ea  2x10 2# ea  Multidirectional ball stability on wall   20x ea  20x ea  ER str  At 90/90 against wall  5x20" 2x30" 2x30"         Foam roll protocol  1' ea  1' ea   1' ea       Pec minor release with lax ball against wall     3' Prone 3' Prone 3'       No money     2x10 5" OTB 3x10  5"  OTB       PNF D2 UE flex     2x10 OTB  3x10  OTB       Standing T's      [place]  2x10       ER at 90/90       2x10 YTB      Horizontal abduction with push-out       2x10 3" OTB      Standing W's       2x10 3" YTB      Patient education       13'      BodyBlade at 90 deg FF       5x15"                                                 Modalities  9/25 9/26 10/1 10/3 10/8

## 2019-10-17 ENCOUNTER — EVALUATION (OUTPATIENT)
Dept: PHYSICAL THERAPY | Facility: REHABILITATION | Age: 69
End: 2019-10-17
Payer: COMMERCIAL

## 2019-10-17 DIAGNOSIS — M25.511 RIGHT SHOULDER PAIN, UNSPECIFIED CHRONICITY: Primary | ICD-10-CM

## 2019-10-17 PROCEDURE — 97140 MANUAL THERAPY 1/> REGIONS: CPT | Performed by: PHYSICAL THERAPIST

## 2019-10-17 PROCEDURE — 97110 THERAPEUTIC EXERCISES: CPT | Performed by: PHYSICAL THERAPIST

## 2019-10-17 NOTE — PROGRESS NOTES
PT Re-Evaluation     Today's date: 10/17/2019  Patient name: Levar Mcclure  : 1950  MRN: [id number]  Referring provider: Justyn Witt MD  Dx:   Encounter Diagnosis     ICD-10-CM    1  Right shoulder pain, unspecified chronicity M25 511        Start Time: 813  Stop Time: 930  Total time in clinic (min): 77 minutes    Assessment  Assessment details: Since beginning physical therapy, Tracey Payne has attended a total number of 7 visits and has maintained excellent compliance with established POC  Patient has made significant improvements in all areas, including decreased pain, increased strength, increased ROM, improved flexibility, improved joint mobility and improved overall level of function  Patient is reporting improved ability to perform a/iadls and engaging in social activities  Despite these improvements, Patient continues to present with pain, decreased strength and decreased ROM  Patient would continue to benefit from skilled physical therapy to address her aforementioned impairments, improve their level of function and to improve their overall quality of life  Impairments: abnormal muscle firing, abnormal or restricted ROM, activity intolerance, impaired physical strength, lacks appropriate home exercise program, pain with function and poor posture   Understanding of Dx/Px/POC: excellent   Prognosis: good    Goals  Short Term Goals: to be achieved by 4 weeks   1) Patient to be independent with basic HEP  MET  2) Decrease pain to 4/10 at it's worst  NOT MET  3) Increase UE strength by 1/2 MMT grade in all deficient planes  PARTIALLY MET MET  4) Increase UE ROM by > 5 deg in all deficient planes  MET  5) Patient to report decreased sleep interruption secondary to pain  MET    Long Term Goals: to be achieved by discharge ALL GOALS PROGRESSING  1) FOTO equal to or greater than TBD  2) Patient to be independent with comprehensive HEP  3) Abolish pain for improved quality of life    4) Increase UE strength to 5/5 MMT grade in all deficient planes to improve a/iadls  5) Increase UE ROM to within 5 deg of contralateral UE to improve a/iadls  6) Patient to report no sleep interruption secondary to pain  Plan  Patient would benefit from: skilled PT  Planned modality interventions: biofeedback, cryotherapy, hydrotherapy, unattended electrical stimulation, thermotherapy: hydrocollator packs and low level laser therapy  Planned therapy interventions: activity modification, ADL retraining, behavior modification, body mechanics training, functional ROM exercises, home exercise program, IADL retraining, joint mobilization, manual therapy, massage, neuromuscular re-education, patient education, postural training, strengthening, stretching, therapeutic activities and therapeutic exercise  Frequency: 1-2x week  Duration in weeks: 12  Treatment plan discussed with: patient        Subjective Evaluation    History of Present Illness  Mechanism of injury: Patient reports that since beginning physical therapy her shoulder has returned to approximately 60% of her premorbid norm  Patient reports that she is able to lay on her back flatter with less pain  Patient reports that she continues to have pain laying on her left side, which can interrupt her sleep  Patient has not returned to playing tennis and believes she would have pain with yard work  Patient continues to have occasional numbness in right hand when her arm is hanging at her side    Pain  Current pain rating: 3  At best pain ratin  At worst pain ratin  Location: right shoulder: superior > biceps  Quality: dull ache and sharp    Treatments  Current treatment: physical therapy  Patient Goals  Patient goals for therapy: decreased pain, independence with ADLs/IADLs, return to sport/leisure activities and increased strength          Objective     Postural Observations  Seated posture: fair  Standing posture: fair    Additional Postural Observation Details  Right GHJ and scapular IR    Tenderness     Right Shoulder  Tenderness in the bicipital groove  Neurological Testing     Sensation     Shoulder   Left Shoulder   Intact: light touch    Right Shoulder   Intact: light touch    Active Range of Motion   Left Shoulder   Flexion: 180 degrees   Abduction: 180 degrees   External rotation BTH: T5   Internal rotation BTB: T4     Right Shoulder   Flexion: 170 degrees   Abduction: 170 degrees   External rotation BTH: T5   Internal rotation BTB: T4     Passive Range of Motion   Left Shoulder   Flexion: 175 degrees   Abduction: 180 degrees   External rotation 90°: 100 degrees   Internal rotation 90°: 45 degrees     Right Shoulder   Flexion: 180 degrees   Abduction: 170 degrees   External rotation 90°: 90 degrees   Internal rotation 90°: 60 degrees     Strength/Myotome Testing   Cervical Spine     Left   Levator scapulae (C4): 5    Right   Levator scapulae (C4): 4+    Left Shoulder     Planes of Motion   Flexion: 4+   Extension: 4+   External rotation at 0°: 5   Internal rotation at 0°: 5     Isolated Muscles   Levator scapulae: 5   Middle trapezius: 5     Right Shoulder     Planes of Motion   Flexion: 5   Extension: 5   External rotation at 0°: 4   Internal rotation at 0°: 5     Isolated Muscles   Levator scapulae: 4+   Middle trapezius: 5     Left Elbow   Flexion: 5  Extension: 5    Right Elbow   Flexion: 5  Extension: 5    Left Wrist/Hand   Wrist flexion: 5    Right Wrist/Hand   Wrist extension: 5  Wrist flexion: 5             Precautions: Lyme disease      Manual  9/25 9/26 10/1 10/3 10/8 10/10 10/15 10/17     Right shoulder PROM  GR GR Deferred         Gr  II-III posterior capsule str  GR GR Deferred         Pec minor release  GR           Multidirectional rhythmic stabilization (60, 90, 120 deg FF)   GR Deferred  90 degrees 3#KB    8'  3# KB ea   GR      Reevaluation        GR         Exercise Diary  9/25 9/26 10/1 10/3 10/8 10/10 10/15 10/17     UBE  3' / 3' 3' / 3' 3' / 3' 3' / 3' 3'/3' 3' / 3' 3' / 3'     Shoulder ext  XS Purple 2x10 3" XS Purple 2x10 3"          ER with TB  2x10 OTB 2x10 OTB          BodyBlade at side   5x30" 5x30"    5x20"     Prone shoulder ext, h  Abd, scaption    15x ea  2x10 2# ea  2x10 2# ea  Multidirectional ball stability on wall   20x ea  20x ea  ER str  At 90/90 against wall  5x20" 2x30" 2x30"         Foam roll protocol  1' ea  1' ea   1' ea       Pec minor release with lax ball against wall     3' Prone 3' Prone 3'       No money     2x10 5" OTB 3x10  5"  OTB       PNF D2 UE flex     2x10 OTB  3x10  OTB       Standing T's      [place]  2x10       ER at 90/90       2x10 YTB 2x10 YTB     Horizontal abduction with push-out       2x10 3" OTB 2x10 3" OTB     Standing W's       2x10 3" YTB 2x10 3" OTB     Patient education       13' 13'     BodyBlade at 90 deg FF       5x15" 5x20"                                                 Modalities  9/25 9/26 10/1 10/3 10/8

## 2019-10-22 ENCOUNTER — OFFICE VISIT (OUTPATIENT)
Dept: PHYSICAL THERAPY | Facility: REHABILITATION | Age: 69
End: 2019-10-22
Payer: COMMERCIAL

## 2019-10-22 DIAGNOSIS — M25.511 RIGHT SHOULDER PAIN, UNSPECIFIED CHRONICITY: Primary | ICD-10-CM

## 2019-10-22 PROCEDURE — 97110 THERAPEUTIC EXERCISES: CPT | Performed by: PHYSICAL THERAPIST

## 2019-10-22 PROCEDURE — 97112 NEUROMUSCULAR REEDUCATION: CPT | Performed by: PHYSICAL THERAPIST

## 2019-10-22 PROCEDURE — 97140 MANUAL THERAPY 1/> REGIONS: CPT | Performed by: PHYSICAL THERAPIST

## 2019-10-22 NOTE — PROGRESS NOTES
Daily Note     Today's date: 10/22/2019  Patient name: Andry Santa  : 1950  MRN: [id number]  Referring provider: Cecy Porter MD  Dx:   Encounter Diagnosis     ICD-10-CM    1  Right shoulder pain, unspecified chronicity M25 511        Start Time: 08  Stop Time: 0855  Total time in clinic (min): 53 minutes    Subjective: Patient reports no significant changes to overall status since previous treatment session  Patient slept good last night  Objective: See treatment diary below      Assessment: Tolerated treatment well  Patient demonstrated fatigue post treatment and would benefit from continued PT  Patient demonstrated fair exercise technique, requiring frequent verbal and tactile cueing to correct  Patient with improving multidirectional GHJ stability  Plan: Continue per plan of care  Progress treatment as tolerated  Precautions: Lyme disease      Manual  9/25 9/26 10/1 10/3 10/8 10/10 10/15 10/17 10/22    Right shoulder PROM  GR GR Deferred         Gr  II-III posterior capsule str  GR GR Deferred         Pec minor release  GR           Multidirectional rhythmic stabilization (60, 90, 120 deg FF)   GR Deferred  90 degrees 3#KB    8'  3# KB ea  GR  3# KB ea  GR    Reevaluation        GR         Exercise Diary  9/25 9/26 10/1 10/3 10/8 10/10 10/15 10/17 10/22    UBE  3' / 3' 3' / 3' 3' / 3' 3' / 3' 3'/3' 3' / 3' 3' / 3' 3' / 3'    Shoulder ext  XS Purple 2x10 3" XS Purple 2x10 3"          ER with TB  2x10 OTB 2x10 OTB          BodyBlade at side   5x30" 5x30"    5x20" 1x10"    Prone shoulder ext, h  Abd, scaption    15x ea  2x10 2# ea  2x10 2# ea  Multidirectional ball stability on wall   20x ea  20x ea  ER str  At 90/90 against wall  5x20" 2x30" 2x30"         Foam roll protocol  1' ea  1' ea   1' ea       Pec minor release with lax ball against wall     3' Prone 3' Prone 3'       No money     2x10 5" OTB 3x10  5"  OTB       PNF D2 UE flex     2x10 OTB  3x10  OTB Standing T's      [place]  2x10       ER at 90/90       2x10 YTB 2x10 YTB 2x10 YTB    Horizontal abduction with push-out       2x10 3" OTB 2x10 3" OTB 2x10 3" YTB    Standing W's       2x10 3" YTB 2x10 3" OTB 2x10 3" OTB    Patient education       13' 13'     BodyBlade at 90 deg FF       5x15" 5x20"  5x10"     ER hold at 90/90         5x30"                                  Modalities  9/25 9/26 10/1 10/3 10/8

## 2019-10-24 ENCOUNTER — OFFICE VISIT (OUTPATIENT)
Dept: PHYSICAL THERAPY | Facility: REHABILITATION | Age: 69
End: 2019-10-24
Payer: COMMERCIAL

## 2019-10-24 DIAGNOSIS — M25.511 RIGHT SHOULDER PAIN, UNSPECIFIED CHRONICITY: Primary | ICD-10-CM

## 2019-10-24 PROCEDURE — 97110 THERAPEUTIC EXERCISES: CPT | Performed by: PHYSICAL THERAPIST

## 2019-10-24 PROCEDURE — 97112 NEUROMUSCULAR REEDUCATION: CPT | Performed by: PHYSICAL THERAPIST

## 2019-10-24 PROCEDURE — 97140 MANUAL THERAPY 1/> REGIONS: CPT | Performed by: PHYSICAL THERAPIST

## 2019-10-24 NOTE — PROGRESS NOTES
Daily Note     Today's date: 10/24/2019  Patient name: Carlie Chapman  : 1950  MRN: [id number]  Referring provider: Juan Rutledge MD  Dx:   Encounter Diagnosis     ICD-10-CM    1  Right shoulder pain, unspecified chronicity M25 511        Start Time: 0810  Stop Time: 0900  Total time in clinic (min): 50 minutes    Subjective: Patient reports that she slept good last night and has minimal discomfort  Objective: See treatment diary below      Assessment: Tolerated treatment well  Patient demonstrated fatigue post treatment, exhibited good technique with therapeutic exercises and would benefit from continued PT  Patient with increased soreness at end of session  Patient continues to be challenged with there ex overhead and at 90/90  Plan: Continue per plan of care  Progress treatment as tolerated  Precautions: Lyme disease      Manual  9/25 9/26 10/1 10/3 10/8 10/10 10/15 10/17 10/22 10/24   Right shoulder PROM  GR GR Deferred         Gr  II-III posterior capsule str  GR GR Deferred         Pec minor release  GR           Multidirectional rhythmic stabilization (60, 90, 120 deg FF)   GR Deferred  90 degrees 3#KB    8'  3# KB ea  GR  3# KB ea  GR 3# KB ea  GR   Reevaluation        GR         Exercise Diary           10/24   UBE          3' / 3'   Shoulder ext  ER with TB             BodyBlade at side             Prone shoulder ext, h  Abd, scaption             Multidirectional ball stability on wall             ER str   At 90/90 against wall             Foam roll protocol             Pec minor release with lax ball against wall              No money          30x5" OTB   PNF D2 UE flex          2x10 YTB   Standing T's      [place]  2x10       ER at 90/90       2x10 YTB 2x10 YTB 2x10 YTB 2x10 YTB    Horizontal abduction with push-out       2x10 3" OTB 2x10 3" OTB 2x10 3" YTB 2x10 3" OTB   Standing W's       2x10 3" YTB 2x10 3" OTB 2x10 3" OTB    Patient education        BodyBlade at 90 deg FF       5x15" 5x20"  5x10"     ER hold at 90/90         5x30" 5x30" 3#                                 Modalities  9/25 9/26 10/1 10/3 10/8

## 2019-10-29 ENCOUNTER — OFFICE VISIT (OUTPATIENT)
Dept: PHYSICAL THERAPY | Facility: REHABILITATION | Age: 69
End: 2019-10-29
Payer: COMMERCIAL

## 2019-10-29 DIAGNOSIS — M25.511 RIGHT SHOULDER PAIN, UNSPECIFIED CHRONICITY: Primary | ICD-10-CM

## 2019-10-29 PROCEDURE — 97140 MANUAL THERAPY 1/> REGIONS: CPT | Performed by: PHYSICAL THERAPIST

## 2019-10-29 PROCEDURE — 97110 THERAPEUTIC EXERCISES: CPT | Performed by: PHYSICAL THERAPIST

## 2019-10-29 PROCEDURE — 97112 NEUROMUSCULAR REEDUCATION: CPT | Performed by: PHYSICAL THERAPIST

## 2019-10-29 NOTE — PROGRESS NOTES
Daily Note     Today's date: 10/29/2019  Patient name: Alexa Sanders  : 1950  MRN: [id number]  Referring provider: Maximiliano Bustillo MD  Dx:   Encounter Diagnosis     ICD-10-CM    1  Right shoulder pain, unspecified chronicity M25 511        Start Time: 810  Stop Time: 920  Total time in clinic (min): 70 minutes    Subjective: Patient reports that she called her referring physician to schedule an MRI of her shoulder  Objective: See treatment diary below      Assessment: Tolerated treatment well  Patient demonstrated fatigue post treatment and would benefit from continued PT  Patient with fair stability with difficulty resisting ER at 90/90  Plan: Continue per plan of care  Progress treatment as tolerated  Precautions: Lyme disease      Manual  10/29         10/24   Right shoulder PROM             Gr  II-III posterior capsule str  Pec minor release             Multidirectional rhythmic stabilization (60, 90, 120 deg FF) 3# KB  GR         3# KB ea  GR   Reevaluation             PNF D2 flexion resistance GR                Exercise Diary  10/29         10/24   UBE 3' / 3'         3' / 3'   Shoulder ext  ER with TB             BodyBlade at side             Prone shoulder ext, h  Abd, scaption 2x10 2# ea  Multidirectional ball stability on wall             ER str   At 90/90 against wall             Foam roll protocol             Pec minor release with lax ball against wall              No money          30x5" OTB   PNF D2 UE flex          2x10 YTB   Standing T's             ER at 90/90          2x10 YTB    Horizontal abduction with push-out          2x10 3" OTB   Standing W's             Patient education             BodyBlade at 90 deg FF             ER hold at 90/90          5x30" 3#   ER 90/90 overhead press 3x10 3#            ER wall slides at 90/90 30x            ER holds at 90/90 with perturbation 10x10" YTB            Patient education  13' Modalities

## 2019-10-31 ENCOUNTER — OFFICE VISIT (OUTPATIENT)
Dept: PHYSICAL THERAPY | Facility: REHABILITATION | Age: 69
End: 2019-10-31
Payer: COMMERCIAL

## 2019-10-31 DIAGNOSIS — M25.511 RIGHT SHOULDER PAIN, UNSPECIFIED CHRONICITY: Primary | ICD-10-CM

## 2019-10-31 PROCEDURE — 97110 THERAPEUTIC EXERCISES: CPT | Performed by: PHYSICAL THERAPIST

## 2019-10-31 PROCEDURE — 97112 NEUROMUSCULAR REEDUCATION: CPT | Performed by: PHYSICAL THERAPIST

## 2019-10-31 NOTE — PROGRESS NOTES
Daily Note     Today's date: 10/31/2019  Patient name: Scarlett Stroud  : 1950  MRN: [id number]  Referring provider: Sheela Lynne MD  Dx:   Encounter Diagnosis     ICD-10-CM    1  Right shoulder pain, unspecified chronicity M25 511        Start Time: 820  Stop Time: 910  Total time in clinic (min): 50 minutes    Subjective: Patient reports that she slept well last night  Objective: See treatment diary below      Assessment: Tolerated treatment well  Patient demonstrated fatigue post treatment, exhibited good technique with therapeutic exercises and would benefit from continued PT  Patient overall continues to progress right shoulder ROM and strength in all planes  Patient continues to present with limited strength and stability above shoulder height  Patient continues to be fearful of returning to tennis and her sleep quality remains fair, although has been improving  Plan: Continue per plan of care  Progress treatment as tolerated  Precautions: Lyme disease      Manual  10/29 10/31        10/24   Right shoulder PROM             Gr  II-III posterior capsule str  Pec minor release             Multidirectional rhythmic stabilization (60, 90, 120 deg FF) 3# KB  GR         3# KB ea  GR   Reevaluation             PNF D2 flexion resistance GR                Exercise Diary  10/29 10/31        10/24   UBE 3' / 3' 3' / 3'        3' / 3'   Shoulder ext  ER with TB             BodyBlade at side             Prone shoulder ext, h  Abd, scaption 2x10 2# ea  Multidirectional ball stability on wall  30x 1 5# ea  ER str   At 90/90 against wall             Foam roll protocol             Pec minor release with lax ball against wall              No money          30x5" OTB   PNF D2 UE flex          2x10 YTB   Standing T's             ER at 90/90  3x10 YTB        2x10 YTB    Horizontal abduction with push-out  3x10 3" OTB        2x10 3" OTB   Standing W's Patient education             BodyBlade at 90 deg FF             ER hold at 90/90          5x30" 3#   ER 90/90 overhead press 3x10 3# 3x10 3#           ER wall slides at 90/90 30x 30x           ER holds at 90/90 with perturbation 10x10" YTB            Patient education  15'            Standing scaption  3x10 YTB                            Modalities

## 2019-11-05 ENCOUNTER — OFFICE VISIT (OUTPATIENT)
Dept: PHYSICAL THERAPY | Facility: REHABILITATION | Age: 69
End: 2019-11-05
Payer: COMMERCIAL

## 2019-11-05 DIAGNOSIS — M25.511 RIGHT SHOULDER PAIN, UNSPECIFIED CHRONICITY: Primary | ICD-10-CM

## 2019-11-05 PROCEDURE — 97110 THERAPEUTIC EXERCISES: CPT | Performed by: PHYSICAL THERAPIST

## 2019-11-05 PROCEDURE — 97112 NEUROMUSCULAR REEDUCATION: CPT | Performed by: PHYSICAL THERAPIST

## 2019-11-05 NOTE — PROGRESS NOTES
Daily Note     Today's date: 2019  Patient name: Brayan Kulkarni  : 1950  MRN: [id number]  Referring provider: Nancie Ly MD  Dx:   Encounter Diagnosis     ICD-10-CM    1  Right shoulder pain, unspecified chronicity M25 511        Start Time: 845  Stop Time: 925  Total time in clinic (min): 40 minutes    Subjective: Patient reports that last night she had a lot of pain in her shoulder and had difficulty sleeping  Patient's pain has since subsided  Patient has been scheduled for an MRI of her right shoulder later in the week  Objective: See treatment diary below      Assessment: Tolerated treatment well  Patient demonstrated fatigue post treatment and would benefit from continued PT  Patient demonstrated fair technique for HEP, requiring verbal, visual and tactile cueing to correct  Patient educated to modify HEP  Plan: Continue per plan of care  Progress treatment as tolerated  Precautions: Lyme disease      Manual  10/29 10/31 11/5       10/24   Right shoulder PROM             Gr  II-III posterior capsule str  Pec minor release             Multidirectional rhythmic stabilization (60, 90, 120 deg FF) 3# KB  GR         3# KB ea  GR   Reevaluation             PNF D2 flexion resistance GR                Exercise Diary  10/29 10/31 11/5       10/24   UBE 3' / 3' 3' / 3' 3' / 3'       3' / 3'   Shoulder ext  ER with TB             BodyBlade at side             Prone shoulder ext, h  Abd, scaption 2x10 2# ea  Multidirectional ball stability on wall  30x 1 5# ea  ER str   At 90/90 against wall             Foam roll protocol             Pec minor release with lax ball against wall              No money          30x5" OTB   PNF D2 UE flex          2x10 YTB   Standing T's             ER at 90/90  3x10 YTB        2x10 YTB    Horizontal abduction with push-out  3x10 3" OTB 3x10 3" OTB       2x10 3" OTB   Standing W's             Patient education             BodyBlade at 90 deg FF             ER hold at 90/90   5x30"       5x30" 3#   ER 90/90 overhead press 3x10 3# 3x10 3# 3x10 3#          ER wall slides at 90/90 30x 30x 30x          ER holds at 90/90 with perturbation 10x10" YTB            Patient education  15'            Standing scaption  3x10 YTB 3x10 YTB                           Modalities

## 2019-11-12 ENCOUNTER — OFFICE VISIT (OUTPATIENT)
Dept: PHYSICAL THERAPY | Facility: REHABILITATION | Age: 69
End: 2019-11-12
Payer: COMMERCIAL

## 2019-11-12 DIAGNOSIS — M25.511 RIGHT SHOULDER PAIN, UNSPECIFIED CHRONICITY: Primary | ICD-10-CM

## 2019-11-12 PROCEDURE — 97110 THERAPEUTIC EXERCISES: CPT | Performed by: PHYSICAL THERAPIST

## 2019-11-12 PROCEDURE — 97112 NEUROMUSCULAR REEDUCATION: CPT | Performed by: PHYSICAL THERAPIST

## 2019-11-19 ENCOUNTER — OFFICE VISIT (OUTPATIENT)
Dept: PHYSICAL THERAPY | Facility: REHABILITATION | Age: 69
End: 2019-11-19
Payer: COMMERCIAL

## 2019-11-19 DIAGNOSIS — M25.511 RIGHT SHOULDER PAIN, UNSPECIFIED CHRONICITY: Primary | ICD-10-CM

## 2019-11-19 PROCEDURE — 97140 MANUAL THERAPY 1/> REGIONS: CPT | Performed by: PHYSICAL THERAPIST

## 2019-11-19 PROCEDURE — 97112 NEUROMUSCULAR REEDUCATION: CPT | Performed by: PHYSICAL THERAPIST

## 2019-11-19 NOTE — PROGRESS NOTES
Daily Note     Today's date: 2019  Patient name: Jody Gamboa  : 1950  MRN: [id number]  Referring provider: Victorino Boss MD  Dx:   Encounter Diagnosis     ICD-10-CM    1  Right shoulder pain, unspecified chronicity M25 511        Start Time: 845  Stop Time: 930  Total time in clinic (min): 45 minutes    Subjective: Patient reports that she slept on her right shoulder last night without pain  Patient's shoulder has been feeling good over the past several days  Patient has not received her MRI results yet  Objective: See treatment diary below      Assessment: Tolerated treatment well  Patient demonstrated fatigue post treatment, exhibited good technique with therapeutic exercises and would benefit from continued PT  No significant changes to overall status this visit  Plan: Continue per plan of care  Progress treatment as tolerated  Precautions: Lyme disease      Manual  10/29 10/31 11/5 11/12 11/19     10/24   Right shoulder PROM             Gr  II-III posterior capsule str  Pec minor release             Multidirectional rhythmic stabilization (60, 90, 120 deg FF) 3# KB  GR         3# KB ea  GR   Reevaluation             PNF D2 flexion resistance GR                Exercise Diary  10/29 10/31 11/5 11/12 11/19     10/24   UBE 3' / 3' 3' / 3' 3' / 3' 3' / 3' 3' / 3'     3' / 3'   Shoulder ext  ER with TB             BodyBlade at side             Prone shoulder ext, h  Abd, scaption 2x10 2# ea  2x10 2# ea  scaption 1#         Multidirectional ball stability on wall  30x 1 5# ea  20x 1 5# ea  ER str   At 90/90 against wall             Foam roll protocol             Pec minor release with lax ball against wall              No money     2x10 5" OTB     30x5" OTB   PNF D2 UE flex    2x10 OTB      2x10 YTB   Standing T's             ER at 90/90  3x10 YTB        2x10 YTB    Horizontal abduction with push-out  3x10 3" OTB 3x10 3" OTB 2x10 3" OTB 2x10 3" OTB   Standing W's     2x10 3" OTB        Patient education             BodyBlade at 90 deg FF             ER hold at 90/90   5x30"       5x30" 3#   ER 90/90 overhead press 3x10 3# 3x10 3# 3x10 3# 3x10 3#         ER wall slides at 90/90 30x 30x 30x 30x         ER holds at 90/90 with perturbation 10x10" YTB            Patient education  15'            Standing scaption  3x10 YTB 3x10 YTB                           Modalities

## 2019-11-25 NOTE — PROGRESS NOTES
Zenaida Vernon has attended a total of 14 physical therapy appointments to date  Patient was last treated on 11/12/19 and has cancelled all remaining appointments scheduled  Goals, objective and subjective information unable to be updated at this time  Patient will be discharged from physical therapy per physician orders

## 2019-11-26 ENCOUNTER — APPOINTMENT (OUTPATIENT)
Dept: PHYSICAL THERAPY | Facility: REHABILITATION | Age: 69
End: 2019-11-26
Payer: COMMERCIAL

## 2020-03-03 NOTE — PROGRESS NOTES
Assessment/Plan   Problem List Items Addressed This Visit     None      Visit Diagnoses     Osteopenia, unspecified location    -  Primary    Relevant Orders    Vitamin D 25 hydroxy    Excess sun exposure        Relevant Orders    Ambulatory referral to Dermatology    Paronychia of finger of right hand        Relevant Medications    mupirocin (BACTROBAN) 2 % ointment      Osteopenia:  Continue active follow-up with her longstanding gynecologist, Dr Dax Manzo  Last DEXA scan of March 21, 2017 showed osteopenia  Shantell Forbes continues exercise regularly, and takes 1000 units a day of vitamin-D  Vitamin-D level be checked  Excessive sun exposure:  Patient is also fair-skinned and does not have a history of skin cancer or family history of melanoma but does need skin surveillance  Shantell Forbes was encouraged to continue wear sunscreen and was referred to Dr Aaron Rojas  Paronychia of the ring finger of the right hand: There has been no response to oral Bactroban and oral doxycycline  Plan is a trial of you prior worsen ointment, after warm soaks twice a day for 10 days, and I asked Shantell Forbes to call back if she was not significantly better in 2 to 3 days  If there is no response, plan would be follow-up with hand surgeon for consideration of exploration including for foreign body  Prevention:  Shantell Forbes reports having received the new shingles vaccine, flu vaccination this season, Prevnar Pneumovax, Adacel as well  Shantell Forbes declines any mammography and has discussed this with her gyn physician  She does have annual exams and does breast self examination  Colonoscopy was 3 years ago and Shantell Forbes is on a 5 year recall list without a history of colon polyps or colon cancer and a negative GI review of systems  She sees Dr Karolyn Winn  I exams are up-to-date with history of previous LASIK surgery  Dental care is up-to-date      A thorough cardiac evaluation has taken place over last year so including recent re-evaluation after vasovagal syncope caused by dehydration  There been no further episodes with adequate intake of water  The Cardiology evaluation was reviewed  Coronary CT score in 2017 was 42  With improvement in diet over time, lipids are optimal as of June 27th with total cholesterol 190, , HDL 77 and triglycerides of 44  Also at that time was a normal CMP including glucose of 98 GFR 74  Hepatitis profile was negative  At that time, TSH was normal 2 01  We discussed that follow-up can be annually and as needed for any problems  Subjective   Patient ID: Amanda Garcia is a 80 y o  female  Amarilys Draper is here today to establish care  The electronic records provided extensive background on her ongoing care including with her previous primary care physician, recent evaluation by Cardiology, and recent visit to Orthopedics for rotator cuff strain with resolution  Vitals:    11/12/18 1407   BP: 115/68   Pulse: 65   Temp: 99 1 °F (37 3 °C)   SpO2: 98%     HPI   Peg is been healthy overall during her life and leads a healthy lifestyle  There is no history of any major medical problems including no history of heart disease  Through diet improvement she has normalized her lipid panel  Preventive care was discussed and please see assessment for details  A month ago she developed a paronychia infection of the right index finger, which has not responded to oral TMP sulfa or oral doxycycline  She still has redness and some mild tenderness with no loss of function  She states this may have occurred after breaking the skin when working with a potted plant  Amarilys Bonny very active playing tennis 3 times we can walking her dog every day the year with excellent exercise tolerance  Diet is very favorable  She had her  are very health conscious  On review there are no deficits of activities of daily living  Cognitive status seems entirely intact  Social supports are excellent      The following portions of the patient's history were reviewed and updated as appropriate: allergies, current medications, past family history, past medical history, past social history, past surgical history and problem list     Review of Systems as above, all others negative  Objective   Physical Exam   Vital signs stable, patient appears well in no acute distress  Fair skin and limited exam of skin shows no rash or skin malignancy  Pulse regular, lungs clear cardiac exam is normal   No thyromegaly  No JVD  No peripheral edema  Peripheral circulation intact  Balance and strength were excellent  Cognitive status intact  Mood optimal   Exam of the right hand demonstrates a paronychial inflammatory area of the right ring finger, no obvious foreign body with some localized increased temperature redness in very mild tenderness with good range of motion without pain and distally sensation and circulation are intact          Patient Active Problem List   Diagnosis    Symptomatic menopausal or female climacteric states    Osteopenia of multiple sites     Current Outpatient Prescriptions:     estradiol-norethindrone (COMBIPATCH) 0 05-0 14 MG/DAY, Place 1 patch on the skin 2 (two) times a week, Disp: , Rfl:     FLUAD 0 5 ML DARINEL, inject 0 5 milliliter intramuscularly, Disp: , Rfl: 0    Omega-3 Fatty Acids ([employer] FISH OIL) 1000 MG CAPS, Take by mouth, Disp: , Rfl:     sertraline (ZOLOFT) 25 mg tablet, Take 25 mg by mouth daily, Disp: , Rfl:     mupirocin (BACTROBAN) 2 % ointment, Apply topically 2 (two) times a day, Disp: 22 g, Rfl: 0 negative...

## 2020-05-15 ENCOUNTER — APPOINTMENT (EMERGENCY)
Dept: RADIOLOGY | Facility: HOSPITAL | Age: 70
End: 2020-05-15
Payer: COMMERCIAL

## 2020-05-15 ENCOUNTER — HOSPITAL ENCOUNTER (EMERGENCY)
Facility: HOSPITAL | Age: 70
Discharge: HOME | End: 2020-05-16
Attending: EMERGENCY MEDICINE | Admitting: EMERGENCY MEDICINE
Payer: COMMERCIAL

## 2020-05-15 VITALS
TEMPERATURE: 97.8 F | OXYGEN SATURATION: 99 % | DIASTOLIC BLOOD PRESSURE: 55 MMHG | WEIGHT: 110 LBS | SYSTOLIC BLOOD PRESSURE: 115 MMHG | HEIGHT: 65 IN | RESPIRATION RATE: 18 BRPM | BODY MASS INDEX: 18.33 KG/M2 | HEART RATE: 56 BPM

## 2020-05-15 DIAGNOSIS — S61.451A DOG BITE OF RIGHT HAND, INITIAL ENCOUNTER: Primary | ICD-10-CM
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
## 2020-05-15 PROCEDURE — 99284 EMERGENCY DEPT VISIT MOD MDM: CPT | Performed by: EMERGENCY MEDICINE

## 2020-05-15 PROCEDURE — 90471 IMMUNIZATION ADMIN: CPT

## 2020-05-15 PROCEDURE — 90715 TDAP VACCINE 7 YRS/> IM: CPT | Performed by: EMERGENCY MEDICINE

## 2020-05-15 PROCEDURE — 99283 EMERGENCY DEPT VISIT LOW MDM: CPT

## 2020-05-15 RX ORDER — DOXYCYCLINE HYCLATE 100 MG/1
100 CAPSULE ORAL ONCE
Status: COMPLETED | OUTPATIENT
Start: 2020-05-15 | End: 2020-05-15

## 2020-05-15 RX ORDER — DOXYCYCLINE HYCLATE 100 MG/1
100 CAPSULE ORAL 2 TIMES DAILY
Qty: 14 CAPSULE | Refills: 0 | Status: SHIPPED | OUTPATIENT
Start: 2020-05-15 | End: 2020-05-22

## 2020-05-15 RX ADMIN — DOXYCYCLINE 100 MG: 100 CAPSULE ORAL at 23:25

## 2020-05-15 RX ADMIN — TETANUS TOXOID, REDUCED DIPHTHERIA TOXOID AND ACELLULAR PERTUSSIS VACCINE, ADSORBED 0.5 ML: 5; 2.5; 8; 8; 2.5 SUSPENSION INTRAMUSCULAR at 23:25

## 2020-06-11 ENCOUNTER — TELEMEDICINE (OUTPATIENT)
Dept: DERMATOLOGY | Facility: CLINIC | Age: 70
End: 2020-06-11
Payer: COMMERCIAL

## 2020-06-11 DIAGNOSIS — D48.5 NEOPLASM OF UNCERTAIN BEHAVIOR OF SKIN: Primary | ICD-10-CM

## 2020-06-11 PROCEDURE — 99214 OFFICE O/P EST MOD 30 MIN: CPT | Performed by: DERMATOLOGY

## 2020-07-16 ENCOUNTER — OFFICE VISIT (OUTPATIENT)
Dept: DERMATOLOGY | Facility: CLINIC | Age: 70
End: 2020-07-16
Payer: COMMERCIAL

## 2020-07-16 VITALS — BODY MASS INDEX: 18.26 KG/M2 | TEMPERATURE: 98.2 F | HEIGHT: 65 IN | WEIGHT: 109.6 LBS

## 2020-07-16 DIAGNOSIS — L81.4 LENTIGO: ICD-10-CM

## 2020-07-16 DIAGNOSIS — L73.8 SEBACEOUS HYPERPLASIA: Primary | ICD-10-CM

## 2020-07-16 PROCEDURE — 1160F RVW MEDS BY RX/DR IN RCRD: CPT | Performed by: DERMATOLOGY

## 2020-07-16 PROCEDURE — 4040F PNEUMOC VAC/ADMIN/RCVD: CPT | Performed by: DERMATOLOGY

## 2020-07-16 PROCEDURE — 99213 OFFICE O/P EST LOW 20 MIN: CPT | Performed by: DERMATOLOGY

## 2020-07-16 NOTE — PATIENT INSTRUCTIONS
SEBACEOUS HYPERPLASIA    Assessment and Plan:  Based on a thorough discussion of this condition and the management approach to it (including a comprehensive discussion of the known risks, side effects and potential benefits of treatment), the patient (family) agrees to implement the following specific plan:  · Reassured benign  No treatment necessary  · Monitor for any changes  If it changes in size we can remove it  · Discussed risks of removal of skin lesion     Sebaceous Hyperplasia  Sebaceous hyperplasia is a common, benign condition of enlarged oil secreting (sebaceous) glands commonly found on the forehead and cheeks of middle-aged and elderly patients  They normally appear as small yellow bumps up to 3mm in diameter that can be single or multiple  The bumps on the face often display a centrall dell  Occasionally, these bumps can occur on the chest, areola, mouth, and genitals  Rarely, they can grow to take a giant form, or be arranged linearly  Causes of sebaceous hyperplasia  Sebaceous hyperplasic is a form of benign hair follicle tumor and can often be confused with basal cell carcinoma  It can be more prevalent in immunosuppressed patients such as those undergoing organ transplantation  In the rare [place] syndrome, sebaceous hyperplasia occurs in association with internal cancers  Lesions of sebaceous hyperplasia are benign, with no known potential for malignant transformation, but they may be associated with nonmelanoma skin cancer in transplantation patients  How we do diagnose sebaceous hyperplasia? Your dermatologist may take a closer look at the bumps with a device called a dermatoscope  Common features include a central hair follicle surrounded by yellowish lobules with prominent blood vessels  What is the treatment of sebaceous hyperplasia?    Since sebaceous hyperplasia is benign with no known potential for transformation into cancer, treatment is mostly for cosmetic reasons or if the lesions become irritated  Options include   - Light electrocautery or laser vaporization  - Oral isotrentinoin is effective for extensive or disfiguring spots, but do not prevent recurrence   - Antiandrogens may be used in females to decrease the size and improve overall appearance of bumps     LENTIGO    Assessment and Plan:  Based on a thorough discussion of this condition and the management approach to it (including a comprehensive discussion of the known risks, side effects and potential benefits of treatment), the patient (family) agrees to implement the following specific plan:   Reassured benign   When outside we recommend using a wide brim hat, sunglasses, long sleeve and pants, sunscreen with SPF 84+ with reapplication every 2 hours, or SPF specific clothing    Discussed treatment options, creams, laser treatment   Follow up in 1 year for a full body check  Advised to call the office to schedule this appointment       What is a lentigo? A lentigo is a pigmented flat or slightly raised lesion with a clearly defined edge  Unlike an ephelis (freckle), it does not fade in the winter months  There are several kinds of lentigo  The name lentigo originally referred to its appearance resembling a small lentil  The plural of lentigo is lentigines, although lentigos is also in common use  Who gets lentigines? Lentigines can affect males and females of all ages and races  Solar lentigines are especially prevalent in fair skinned adults  Lentigines associated with syndromes are present at birth or arise during childhood  What causes lentigines? Common forms of lentigo are due to exposure to ultraviolet radiation:   Sun damage including sunburn    Indoor tanning    Phototherapy, especially photochemotherapy (PUVA)    Ionizing radiation, eg radiation therapy, can also cause lentigines    Several familial syndromes associated with widespread lentigines originate from mutations in Seng-MAP kinase, mTOR signaling and PTEN pathways  What are the clinical features of lentigines? Lentigines have been classified into several different types depending on what they look like, where they appear on the body, causative factors, and whether they are associated to other diseases or conditions  Lentigines may be solitary or more often, multiple  Most lentigines are smaller than 5 mm in diameter      Lentigo simplex   A precursor to junctional naevus    Arises during childhood and early adult life    Found on trunk and limbs    Small brown round or oval macule or thin plaque    Jagged or smooth edge    May have a dry surface    May disappear in time  Solar lentigo   A precursor to seborrhoeic keratosis    Found on chronically sun exposed sites such as hands, face, lower legs    May also follow sunburn to shoulders    Yellow, light or dark brown regular or irregular macule or thin plaque    May have a dry surface    Often has moth-eaten outline    Can slowly enlarge to several centimeters in diameter    May disappear, often through the process known as lichenoid keratosis    When atypical in appearance, may be difficult to distinguish from melanoma in situ  Ink spot lentigo   Also known as reticulated lentigo    Few in number compared to solar lentigines    Follows sunburn in very fair skinned individuals    Dark brown to black irregular ink spot-like macule  PUVA lentigo   Similar to ink spot lentigo but follows photochemotherapy (PUVA)    Location anywhere exposed to PUVA  Tanning bed lentigo   Similar to ink spot lentigo but follows indoor tanning    Location anywhere exposed to tanning bed  Radiation lentigo   Occurs in site of irradiation (accidental or therapeutic)    Associated with late-stage radiation dermatitis: epidermal atrophy, subcutaneous fibrosis, keratosis, telangiectasias  Melanotic macule   Mucosal surfaces or adjacent glabrous skin eg lip, vulva, penis, anus  Light to dark brown    Also called mucosal melanosis  Generalised lentigines   Found on any exposed or covered site from early childhood    Small macules may merge to form larger patches    Not associated with a syndrome    Also called lentigines profusa, multiple lentigines  Agminated lentigines   Naevoid eruption of lentigos confined to a single segmental area    Sharp demarcation in midline    May have associated neurological and developmental abnormalities  Patterned lentigines   Inherited tendency to lentigines on face, lips, buttocks, palms, soles    Recognised mainly in people of  ethnicity  Centrofacial neurodysraphic lentiginosis  Associated with mental retardation  Lentiginosis syndromes   Syndromes include LEOPARD/[place], Peutz-Jeghers, Laugier-Hunziker, Moynahan, Xeroderma pigmentosum, myxoma syndromes ([place]TH, NAME, Botello), Ruvalcaba-Myhre-Carrillo, Bannayan-Zonnana syndrome, Cowden disease (multiple hamartoma syndrome )    Inheritance is autosomal dominant; sporadic cases common    Widespread lentigines present at birth or arise in early childhood    Associated with neural, endocrine, and mesenchymal tumors    How is the diagnosis made? Lentigines are usually diagnosed clinically by their typical appearance  Concern regarding possibility of melanoma may lead to:   Dermatoscopy    Diagnostic excision biopsy    Histopathology of a lentigo shows:   Thickened epidermis    An increased number of melanocytes along the basal layer of epidermis    Unlike junctional melanocytic naevus, there are no nests of melanocytes    Increased melanin pigment within the keratinocytes    Additional features depending on type of lentigo    In contrast, an ephelis (freckle) shows sun-induced increased melanin within the keratinocytes, without an increase in number of cells  What is the treatment for lentigines? Most lentigines are left alone  Attempts to lighten them may not be successful   The following approaches are used:   SPF 50+ broad-spectrum sunscreen    Hydroquinone bleaching cream    Alpha hydroxy acids    Vitamin C    Retinoids    Azelaic acid    Chemical peels  Individual lesions can be permanently removed using:   Cryotherapy    Intense pulsed light    Pigment lasers    How can lentigines be prevented? Lentigines associated with exposure ultraviolet radiation can be prevented by very careful sun protection  Clothing is more successful at preventing new lentigines than are sunscreens  What is the outlook for lentigines? Lentigines usually persist  They may increase in number with age and sun exposure   Some in sun-protected sites may fade and disappear

## 2020-07-16 NOTE — PROGRESS NOTES
[facility] Follow Up Note    Patient Name: Alexia Hopkins  Encounter Date: 7/16/2020    Today's Chief Concerns:  [facility] Concern #1:  Right eyelid cyst    Concern #2:  Full body check       Current Medications:    Current Outpatient Medications:     aspirin (ECOTRIN LOW STRENGTH) 81 mg EC tablet, Take 81 mg by mouth daily in the early morning, Disp: , Rfl:     cholecalciferol (VITAMIN D3) 1,000 units tablet, Take 2,000 Units by mouth daily, Disp: , Rfl:     estradiol-norethindrone (COMBIPATCH) 0 05-0 14 MG/DAY, Place 1 patch on the skin 2 (two) times a week, Disp: , Rfl:     Omega-3 Fatty Acids ([employer] FISH OIL) 1000 MG CAPS, Take by mouth daily , Disp: , Rfl:     sertraline (ZOLOFT) 25 mg tablet, Take 25 mg by mouth daily in the early morning , Disp: , Rfl:     CONSTITUTIONAL:   Vitals:    07/16/20 1507   Temp: 98 2 °F (36 8 °C)   TempSrc: Temporal   Weight: 49 7 kg (109 lb 9 6 oz)   Height: 5' 4 5" (1 638 m)         Specific Alerts:    Have you been seen by a [facility] in the last 3 years? No    Are you pregnant or planning to become pregnant? No    Are you currently or planning to be nursing or breast feeding? No    Allergies   Allergen Reactions    Crestor [Rosuvastatin] Hives    Zocor [Simvastatin] Hives    Cephalexin Rash    Ezetimibe Rash    Penicillins Rash    Pravastatin Rash       May we call your Preferred Phone number to discuss your specific medical information? No    May we leave a detailed message that includes your specific medical information? No    Have you traveled outside of the [facility] in the past 3 months? No    Do you currently have a pacemaker or defibrillator? No    Do you have any artificial heart valves, joints, plates, screws, rods, stents, pins, etc? No   - If Yes, were any placed within the last 2 years? Do you require any medications prior to a surgical procedure?  No    Are you taking any medications that cause you to bleed more easily ("blood thinners") YES, Aspirin    Have you ever experienced a rapid heartbeat with epinephrine? No    Have you ever been treated with "gold" (gold sodium thiomalate) therapy? No    [facility] can help with wrinkles, "laugh lines," facial volume loss, "double chin," "love handles," age spots, and more  Are you interested in learning today about some of the skin enhancement procedures that we offer? (If Yes, please provide more detail) No    Review of Systems:  Have you recently had or currently have any of the following?     · Fever or chills: No  · Night Sweats: No  · Headaches: No  · Weight Gain: No  · Weight Loss: No  · Blurry Vision: No  · Nausea: No  · Vomiting: No  · Diarrhea: No  · Blood in Stool: No  · Abdominal Pain: No  · Itchy Skin: No  · Painful Joints: No  · Swollen Joints: No  · Muscle Pain: No  · Irregular Mole: No  · Sun Burn: No  · Dry Skin: No  · Skin Color Changes: No  · Scar or Keloid: No  · Cold Sores/Fever Blisters: No  · Bacterial Infections/MRSA: No  · Anxiety: No  · Depression: No  · Suicidal or Homicidal Thoughts: No      PSYCH: Normal mood and affect  EYES: Normal conjunctiva  ENT: Normal lips and oral mucosa  CARDIOVASCULAR: No edema  RESPIRATORY: Normal respirations  HEME/LYMPH/IMMUNO:  No regional lymphadenopathy except as noted below in ASSESSMENT AND PLAN BY DIAGNOSIS    FULL ORGAN SYSTEM SKIN EXAM (SKIN)   Hair, Scalp, Ears, Face Normal except as noted below in Assessment   Neck, Cervical Chain Nodes Normal except as noted below in Assessment   Right Arm/Hand/Fingers Normal except as noted below in Assessment   Left Arm/Hand/Fingers Normal except as noted below in Assessment   Chest/Breasts/Axillae Viewed areas Normal except as noted below in Assessment   Abdomen, Umbilicus Normal except as noted below in Assessment   Back/Spine Normal except as noted below in Assessment   Groin/Genitalia/Buttocks Viewed areas Normal except as noted below in Assessment   Right Leg, Foot, Toes Normal except as noted below in Assessment   Left Leg, Foot, Toes Normal except as noted below in Assessment       1  SEBACEOUS HYPERPLASIA    Physical Exam:   Anatomic Location Affected:  Right lower eyelid tear trough   Morphological Description:  Smooth surface, 0 3 cm flash to yellow papule   Pertinent Positives:   Pertinent Negatives: Additional History of Present Condition:  Present for about a year  Denies pain, irritation, redness, blistering  No treatment    Assessment and Plan:  Based on a thorough discussion of this condition and the management approach to it (including a comprehensive discussion of the known risks, side effects and potential benefits of treatment), the patient (family) agrees to implement the following specific plan:  · Reassured benign  No treatment necessary  · Monitor for any changes  If it changes in size we can remove it  · Discussed risks of removal of skin lesion     Sebaceous Hyperplasia  Sebaceous hyperplasia is a common, benign condition of enlarged oil secreting (sebaceous) glands commonly found on the forehead and cheeks of middle-aged and elderly patients  They normally appear as small yellow bumps up to 3mm in diameter that can be single or multiple  The bumps on the face often display a centrall dell  Occasionally, these bumps can occur on the chest, areola, mouth, and genitals  Rarely, they can grow to take a giant form, or be arranged linearly  Causes of sebaceous hyperplasia  Sebaceous hyperplasic is a form of benign hair follicle tumor and can often be confused with basal cell carcinoma  It can be more prevalent in immunosuppressed patients such as those undergoing organ transplantation  In the rare Oscar-Farson syndrome, sebaceous hyperplasia occurs in association with internal cancers    Lesions of sebaceous hyperplasia are benign, with no known potential for malignant transformation, but they may be associated with nonmelanoma skin cancer in transplantation patients  How we do diagnose sebaceous hyperplasia? Your dermatologist may take a closer look at the bumps with a device called a dermatoscope  Common features include a central hair follicle surrounded by yellowish lobules with prominent blood vessels  What is the treatment of sebaceous hyperplasia? Since sebaceous hyperplasia is benign with no known potential for transformation into cancer, treatment is mostly for cosmetic reasons or if the lesions become irritated  Options include   - Light electrocautery or laser vaporization  - Oral isotrentinoin is effective for extensive or disfiguring spots, but do not prevent recurrence   - Antiandrogens may be used in females to decrease the size and improve overall appearance of bumps     2  LENTIGO    Physical Exam:   Anatomic Location Affected:  Bilateral Legs   Morphological Description:  Scattered 2-4 light tan around to avoid flat top papules   Pertinent Positives:   Pertinent Negatives:      Assessment and Plan:  Based on a thorough discussion of this condition and the management approach to it (including a comprehensive discussion of the known risks, side effects and potential benefits of treatment), the patient (family) agrees to implement the following specific plan:   Reassured benign   When outside we recommend using a wide brim hat, sunglasses, long sleeve and pants, sunscreen with SPF 00+ with reapplication every 2 hours, or SPF specific clothing    Discussed treatment options, creams, laser treatment   Follow up in 1 year for a full body check  Advised to call the office to schedule this appointment       What is a lentigo? A lentigo is a pigmented flat or slightly raised lesion with a clearly defined edge  Unlike an ephelis (freckle), it does not fade in the winter months  There are several kinds of lentigo  The name lentigo originally referred to its appearance resembling a small lentil   The plural of lentigo is lentigines, although lentigos is also in common use  Who gets lentigines? Lentigines can affect males and females of all ages and races  Solar lentigines are especially prevalent in fair skinned adults  Lentigines associated with syndromes are present at birth or arise during childhood  What causes lentigines? Common forms of lentigo are due to exposure to ultraviolet radiation:   Sun damage including sunburn    Indoor tanning    Phototherapy, especially photochemotherapy (PUVA)    Ionizing radiation, eg radiation therapy, can also cause lentigines  Several familial syndromes associated with widespread lentigines originate from mutations in Seng-MAP kinase, mTOR signaling and PTEN pathways  What are the clinical features of lentigines? Lentigines have been classified into several different types depending on what they look like, where they appear on the body, causative factors, and whether they are associated to other diseases or conditions  Lentigines may be solitary or more often, multiple  Most lentigines are smaller than 5 mm in diameter      Lentigo simplex   A precursor to junctional naevus    Arises during childhood and early adult life    Found on trunk and limbs    Small brown round or oval macule or thin plaque    Jagged or smooth edge    May have a dry surface    May disappear in time  Solar lentigo   A precursor to seborrhoeic keratosis    Found on chronically sun exposed sites such as hands, face, lower legs    May also follow sunburn to shoulders    Yellow, light or dark brown regular or irregular macule or thin plaque    May have a dry surface    Often has moth-eaten outline    Can slowly enlarge to several centimeters in diameter    May disappear, often through the process known as lichenoid keratosis    When atypical in appearance, may be difficult to distinguish from melanoma in situ  Ink spot lentigo   Also known as reticulated lentigo    Few in number compared to solar lentigines    Follows sunburn in very fair skinned individuals    Dark brown to black irregular ink spot-like macule  PUVA lentigo   Similar to ink spot lentigo but follows photochemotherapy (PUVA)    Location anywhere exposed to PUVA  Tanning bed lentigo   Similar to ink spot lentigo but follows indoor tanning    Location anywhere exposed to tanning bed  Radiation lentigo   Occurs in site of irradiation (accidental or therapeutic)    Associated with late-stage radiation dermatitis: epidermal atrophy, subcutaneous fibrosis, keratosis, telangiectasias  Melanotic macule   Mucosal surfaces or adjacent glabrous skin eg lip, vulva, penis, anus    Light to dark brown    Also called mucosal melanosis  Generalised lentigines   Found on any exposed or covered site from early childhood    Small macules may merge to form larger patches    Not associated with a syndrome    Also called lentigines profusa, multiple lentigines  Agminated lentigines   Naevoid eruption of lentigos confined to a single segmental area    Sharp demarcation in midline    May have associated neurological and developmental abnormalities  Patterned lentigines   Inherited tendency to lentigines on face, lips, buttocks, palms, soles    Recognised mainly in people of  ethnicity  Centrofacial neurodysraphic lentiginosis  Associated with mental retardation  Lentiginosis syndromes   Syndromes include LEOPARD/Aspers, Peutz-Jeghers, Laugier-Hunziker, Moynahan, Xeroderma pigmentosum, myxoma syndromes (WADSWORTH, NAME, Botello), Ruvalcaba-Myhre-Carrillo, Bannayan-Zonnana syndrome, Cowden disease (multiple hamartoma syndrome )    Inheritance is autosomal dominant; sporadic cases common    Widespread lentigines present at birth or arise in early childhood    Associated with neural, endocrine, and mesenchymal tumors    How is the diagnosis made? Lentigines are usually diagnosed clinically by their typical appearance   Concern regarding possibility of melanoma may lead to:   Dermatoscopy    Diagnostic excision biopsy    Histopathology of a lentigo shows:   Thickened epidermis    An increased number of melanocytes along the basal layer of epidermis    Unlike junctional melanocytic naevus, there are no nests of melanocytes    Increased melanin pigment within the keratinocytes    Additional features depending on type of lentigo    In contrast, an ephelis (freckle) shows sun-induced increased melanin within the keratinocytes, without an increase in number of cells  What is the treatment for lentigines? Most lentigines are left alone  Attempts to lighten them may not be successful  The following approaches are used:   SPF 50+ broad-spectrum sunscreen    Hydroquinone bleaching cream    Alpha hydroxy acids    Vitamin C    Retinoids    Azelaic acid    Chemical peels  Individual lesions can be permanently removed using:   Cryotherapy    Intense pulsed light    Pigment lasers    How can lentigines be prevented? Lentigines associated with exposure ultraviolet radiation can be prevented by very careful sun protection  Clothing is more successful at preventing new lentigines than are sunscreens  What is the outlook for lentigines? Lentigines usually persist  They may increase in number with age and sun exposure  Some in sun-protected sites may fade and disappear      Scribe Attestation    I,:   Darin Blankenship am acting as a scribe while in the presence of the attending physician :        I,:   Lisette Plaza MD personally performed the services described in this documentation    as scribed in my presence :

## 2020-08-07 ENCOUNTER — OFFICE VISIT (OUTPATIENT)
Dept: FAMILY MEDICINE CLINIC | Facility: CLINIC | Age: 70
End: 2020-08-07
Payer: COMMERCIAL

## 2020-08-07 VITALS
BODY MASS INDEX: 17.93 KG/M2 | SYSTOLIC BLOOD PRESSURE: 110 MMHG | RESPIRATION RATE: 18 BRPM | DIASTOLIC BLOOD PRESSURE: 60 MMHG | HEIGHT: 65 IN | OXYGEN SATURATION: 97 % | HEART RATE: 51 BPM | TEMPERATURE: 97.5 F | WEIGHT: 107.6 LBS

## 2020-08-07 DIAGNOSIS — N20.0 RENAL CALCULUS, RIGHT: ICD-10-CM

## 2020-08-07 DIAGNOSIS — N20.0 KIDNEY STONE ON LEFT SIDE: ICD-10-CM

## 2020-08-07 DIAGNOSIS — E78.00 PURE HYPERCHOLESTEROLEMIA: ICD-10-CM

## 2020-08-07 DIAGNOSIS — Z86.018 HISTORY OF PITUITARY ADENOMA: ICD-10-CM

## 2020-08-07 DIAGNOSIS — M85.89 OSTEOPENIA OF MULTIPLE SITES: ICD-10-CM

## 2020-08-07 DIAGNOSIS — Z00.00 ENCOUNTER FOR MEDICARE ANNUAL WELLNESS EXAM: Primary | ICD-10-CM

## 2020-08-07 DIAGNOSIS — R93.1 AGATSTON CORONARY ARTERY CALCIUM SCORE LESS THAN 100: ICD-10-CM

## 2020-08-07 PROCEDURE — 99214 OFFICE O/P EST MOD 30 MIN: CPT | Performed by: INTERNAL MEDICINE

## 2020-08-07 PROCEDURE — 1125F AMNT PAIN NOTED PAIN PRSNT: CPT | Performed by: INTERNAL MEDICINE

## 2020-08-07 PROCEDURE — 1160F RVW MEDS BY RX/DR IN RCRD: CPT | Performed by: INTERNAL MEDICINE

## 2020-08-07 PROCEDURE — 3008F BODY MASS INDEX DOCD: CPT | Performed by: INTERNAL MEDICINE

## 2020-08-07 PROCEDURE — 1036F TOBACCO NON-USER: CPT | Performed by: INTERNAL MEDICINE

## 2020-08-07 PROCEDURE — G0439 PPPS, SUBSEQ VISIT: HCPCS | Performed by: INTERNAL MEDICINE

## 2020-08-07 PROCEDURE — 1170F FXNL STATUS ASSESSED: CPT | Performed by: INTERNAL MEDICINE

## 2020-08-07 PROCEDURE — 4040F PNEUMOC VAC/ADMIN/RCVD: CPT | Performed by: INTERNAL MEDICINE

## 2020-08-07 RX ORDER — DOXYCYCLINE HYCLATE 100 MG
TABLET ORAL
COMMUNITY
End: 2020-10-08 | Stop reason: CLARIF

## 2020-08-07 RX ORDER — TRAMADOL HYDROCHLORIDE 50 MG/1
TABLET ORAL
COMMUNITY
End: 2020-10-08 | Stop reason: CLARIF

## 2020-08-07 RX ORDER — TAMSULOSIN HYDROCHLORIDE 0.4 MG/1
CAPSULE ORAL
COMMUNITY
End: 2020-10-08 | Stop reason: CLARIF

## 2020-08-07 RX ORDER — ONDANSETRON HYDROCHLORIDE 8 MG/1
TABLET, FILM COATED ORAL
COMMUNITY
End: 2020-10-08 | Stop reason: CLARIF

## 2020-08-07 RX ORDER — SULFAMETHOXAZOLE AND TRIMETHOPRIM 800; 160 MG/1; MG/1
TABLET ORAL
COMMUNITY
End: 2020-10-08 | Stop reason: CLARIF

## 2020-08-07 NOTE — PROGRESS NOTES
Assessment and Plan:     Problem List Items Addressed This Visit        Musculoskeletal and Integument    Osteopenia of multiple sites       Genitourinary    Kidney stone on left side    Relevant Medications    traMADol (ULTRAM) 50 mg tablet    Other Relevant Orders    Ambulatory referral to Urology    CBC and differential    Comprehensive metabolic panel    LDL cholesterol, direct    Lipid panel    Apolipoprotein B    TSH, 3rd generation with Free T4 reflex    Renal calculus, right    Relevant Medications    traMADol (ULTRAM) 50 mg tablet       Other    History of pituitary adenoma    Relevant Orders    Ambulatory referral to Urology    CBC and differential    Comprehensive metabolic panel    LDL cholesterol, direct    Lipid panel    Apolipoprotein B    TSH, 3rd generation with Free T4 reflex    Pure hypercholesterolemia    Relevant Orders    Ambulatory referral to Urology    CBC and differential    Comprehensive metabolic panel    LDL cholesterol, direct    Lipid panel    Apolipoprotein B    TSH, 3rd generation with Free T4 reflex      Other Visit Diagnoses     Encounter for Medicare annual wellness exam    -  Primary    Relevant Orders    Ambulatory referral to Urology    CBC and differential    Comprehensive metabolic panel    LDL cholesterol, direct    Lipid panel    Apolipoprotein B    TSH, 3rd generation with Free T4 reflex    Agatston coronary artery calcium score less than 100        Relevant Orders    Ambulatory referral to Urology    CBC and differential    Comprehensive metabolic panel    LDL cholesterol, direct    Lipid panel    Apolipoprotein B    TSH, 3rd generation with Free T4 reflex           Preventive health issues were discussed with patient, and age appropriate screening tests were ordered as noted in patient's After Visit Summary  Personalized health advice and appropriate referrals for health education or preventive services given if needed, as noted in patient's After Visit Summary  History of Present Illness:     Patient presents for Medicare Annual Wellness visit    Patient Care Team:  Carly Munguia MD as PCP - General (Internal Medicine)     Problem List:     Patient Active Problem List   Diagnosis    Symptomatic menopausal or female climacteric states    Osteopenia of multiple sites    Nephrolithiasis    Kidney stone on left side    Renal calculus, right    Kidney stone    History of pituitary adenoma    Pure hypercholesterolemia      Past Medical and Surgical History:     Past Medical History:   Diagnosis Date    Epilepsy ([address] )     petite mal - last seizure approx 26 yrs age   [facility] History of bilateral breast implants     Kidney stone     left side    Lyme disease     2016     Past Surgical History:   Procedure Laterality Date    BREAST SURGERY      Augmentation with breast implants    COLONOSCOPY      FL RETROGRADE PYELOGRAM  5/8/2019    FL RETROGRADE PYELOGRAM  5/28/2019    FL RETROGRADE PYELOGRAM  7/9/2019    VA CYSTO/URETERO W/LITHOTRIPSY &INDWELL STENT INSRT Left 5/8/2019    Procedure: CYSTOSCOPY; RETROGRADE PYELOGRAM AND INSERTION STENT URETERAL;  Surgeon: Jessie Carranza MD;  [facility];  Service: Urology    VA CYSTO/URETERO W/LITHOTRIPSY &INDWELL STENT INSRT Left 5/28/2019    Procedure: CYSTOSCOPY URETEROSCOPY WITH LITHOTRIPSY HOLMIUM LASER, RETROGRADE PYELOGRAM AND INSERTION STENT URETERAL;  Surgeon: Jessie Carranza MD;  [facility];  Service: Urology    VA CYSTO/URETERO W/LITHOTRIPSY &INDWELL STENT INSRT Right 7/9/2019    Procedure: CYSTOSCOPY URETEROSCOPY WITH RETROGRADE PYELOGRAM AND INSERTION STENT URETERAL;  Surgeon: Jessie Carranza MD;  [facility];  Service: Urology    VA FRAGMENT KIDNEY STONE/ ESWL Right 8/2/2019    Procedure: [facility] ([facility]);   Surgeon: Jessie Carranza MD;  [facility];  Service: Urology    ROTATOR CUFF REPAIR Right     "Open"    WISDOM TOOTH EXTRACTION Family History:     Family History   Problem Relation Age of Onset    Stroke Father     Ovarian cancer Maternal Aunt       Social History:        Social History     Socioeconomic History    Marital status: /Civil Union     Spouse name: None    Number of children: None    Years of education: None    Highest education level: None   Occupational History    Occupation: , , educator   Social Needs    Financial resource strain: None    Food insecurity     Worry: None     Inability: None    Transportation needs     Medical: None     Non-medical: None   Tobacco Use    Smoking status: Never Smoker    Smokeless tobacco: Never Used   Substance and Sexual Activity    Alcohol use: Yes     Frequency: 4 or more times a week     Drinks per session: 1 or 2     Binge frequency: Never     Comment: 1 drink per night bourbon    Drug use: No    Sexual activity: None   Lifestyle    Physical activity     Days per week: None     Minutes per session: None    Stress: None   Relationships    Social connections     Talks on phone: None     Gets together: None     Attends Bahai service: None     Active member of club or organization: None     Attends meetings of clubs or organizations: None     Relationship status: None    Intimate partner violence     Fear of current or ex partner: None     Emotionally abused: None     Physically abused: None     Forced sexual activity: None   Other Topics Concern    None   Social History Narrative    None      Medications and Allergies:     Current Outpatient Medications   Medication Sig Dispense Refill    aspirin (ECOTRIN LOW STRENGTH) 81 mg EC tablet Take 81 mg by mouth daily in the early morning      cholecalciferol (VITAMIN D3) 1,000 units tablet Take 2,000 Units by mouth daily      estradiol-norethindrone (COMBIPATCH) 0 05-0 14 MG/DAY Place 1 patch on the skin 2 (two) times a week      Omega-3 Fatty Acids (CVS FISH OIL) 1000 MG CAPS Take by mouth daily       sertraline (ZOLOFT) 25 mg tablet Take 25 mg by mouth daily in the early morning       doxycycline hyclate (VIBRA-TABS) 100 mg tablet doxycycline hyclate 100 mg tablet      gentian violet 1 % topical solution gentian violet 1 % topical solution   APPLY TO THE AFFECTED AREA AS DIRECTED      ondansetron (ZOFRAN) 8 mg tablet ondansetron HCl 8 mg tablet      sulfamethoxazole-trimethoprim (BACTRIM DS) 800-160 mg per tablet sulfamethoxazole 800 mg-trimethoprim 160 mg tablet      tamsulosin (FLOMAX) 0 4 mg tamsulosin 0 4 mg capsule      traMADol (ULTRAM) 50 mg tablet tramadol 50 mg tablet       No current facility-administered medications for this visit  Allergies   Allergen Reactions    Crestor [Rosuvastatin] Hives    Zocor [Simvastatin] Hives    Cephalexin Rash    Ezetimibe Rash    Penicillins Rash    Pravastatin Rash      Immunizations:     Immunization History   Administered Date(s) Administered    INFLUENZA 11/17/2008, 11/13/2015, 12/07/2016, 11/02/2017, 11/02/2017, 10/08/2018    Influenza Split 11/29/2010    Influenza Split High Dose Preservative Free IM 10/08/2018    Influenza TIV (IM) 11/07/2012, 11/12/2013, 10/31/2014, 11/01/2017    Pneumococcal Conjugate 13-Valent 11/02/2018, 02/25/2020    Tdap 05/15/2020    Zoster 09/12/2013, 01/01/2016, 10/22/2018    Zoster Vaccine Recombinant 01/01/2016, 10/22/2018, 01/07/2019    influenza, trivalent, adjuvanted 10/14/2019      Health Maintenance:         Topic Date Due    MAMMOGRAM  1950    Hepatitis C Screening  Completed         Topic Date Due    Influenza Vaccine  07/01/2020      Medicare Health Risk Assessment:     /60 (BP Location: Left arm, Patient Position: Sitting, Cuff Size: Adult)   Pulse (!) 51   Temp 97 5 °F (36 4 °C)   Resp 18   Ht 5' 4 5" (1 638 m)   Wt 48 8 kg (107 lb 9 6 oz)   SpO2 97%   BMI 18 18 kg/m²      Taye Kern is here for her Subsequent Wellness visit       Health Risk Assessment: Patient rates overall health as excellent  Patient feels that their physical health rating is same  Eyesight was rated as same  Hearing was rated as same  Patient feels that their emotional and mental health rating is same  Pain experienced in the last 7 days has been none  Patient states that she has experienced no weight loss or gain in last 6 months  Fall Risk Screening: In the past year, patient has experienced: no history of falling in past year      Urinary Incontinence Screening:   Patient has not leaked urine accidently in the last six months  Home Safety:  Patient does not have trouble with stairs inside or outside of their home  Patient has working smoke alarms and has working carbon monoxide detector  Home safety hazards include: none  Nutrition:   Current diet is Regular  Medications:   Patient is currently taking over-the-counter supplements  OTC medications include: see medication list  Patient is able to manage medications  Activities of Daily Living (ADLs)/Instrumental Activities of Daily Living (IADLs):   Walk and transfer into and out of bed and chair?: Yes  Dress and groom yourself?: Yes    Bathe or shower yourself?: Yes    Feed yourself? Yes  Do your laundry/housekeeping?: Yes  Manage your money, pay your bills and track your expenses?: Yes  Make your own meals?: Yes    Do your own shopping?: Yes    Previous Hospitalizations:   Any hospitalizations or ED visits within the last 12 months?: Yes    How many hospitalizations have you had in the last year?: 1-2    Hospitalization Comments: Dog bite on finger      Advance Care Planning:   Living will: No    Durable POA for healthcare: No    Advanced directive: No      PREVENTIVE SCREENINGS        Colorectal Cancer Screening:     General: Screening Current      Cervical Cancer Screening:    General: Screening Not Indicated      Lung Cancer Screening:     General: Screening Not Indicated      Hepatitis C Screening:    General: Screening Current      Pretty Talbot MD

## 2020-08-11 ENCOUNTER — APPOINTMENT (OUTPATIENT)
Dept: LAB | Facility: CLINIC | Age: 70
End: 2020-08-11
Payer: COMMERCIAL

## 2020-08-11 DIAGNOSIS — R93.1 AGATSTON CORONARY ARTERY CALCIUM SCORE LESS THAN 100: ICD-10-CM

## 2020-08-11 DIAGNOSIS — E78.00 PURE HYPERCHOLESTEROLEMIA: ICD-10-CM

## 2020-08-11 DIAGNOSIS — Z00.00 ENCOUNTER FOR MEDICARE ANNUAL WELLNESS EXAM: ICD-10-CM

## 2020-08-11 DIAGNOSIS — Z86.018 HISTORY OF PITUITARY ADENOMA: ICD-10-CM

## 2020-08-11 DIAGNOSIS — N20.0 KIDNEY STONE ON LEFT SIDE: ICD-10-CM

## 2020-08-11 LAB
ALBUMIN SERPL BCP-MCNC: 3.6 G/DL (ref 3.5–5)
ALP SERPL-CCNC: 53 U/L (ref 46–116)
ALT SERPL W P-5'-P-CCNC: 25 U/L (ref 12–78)
ANION GAP SERPL CALCULATED.3IONS-SCNC: 6 MMOL/L (ref 4–13)
AST SERPL W P-5'-P-CCNC: 22 U/L (ref 5–45)
BASOPHILS # BLD AUTO: 0.04 THOUSANDS/ΜL (ref 0–0.1)
BASOPHILS NFR BLD AUTO: 1 % (ref 0–1)
BILIRUB SERPL-MCNC: 1.41 MG/DL (ref 0.2–1)
BUN SERPL-MCNC: 14 MG/DL (ref 5–25)
CALCIUM SERPL-MCNC: 9.2 MG/DL (ref 8.3–10.1)
CHLORIDE SERPL-SCNC: 108 MMOL/L (ref 100–108)
CHOLEST SERPL-MCNC: 243 MG/DL (ref 50–200)
CO2 SERPL-SCNC: 27 MMOL/L (ref 21–32)
CREAT SERPL-MCNC: 0.82 MG/DL (ref 0.6–1.3)
EOSINOPHIL # BLD AUTO: 0.08 THOUSAND/ΜL (ref 0–0.61)
EOSINOPHIL NFR BLD AUTO: 2 % (ref 0–6)
ERYTHROCYTE [DISTWIDTH] IN BLOOD BY AUTOMATED COUNT: 13.3 % (ref 11.6–15.1)
GFR SERPL CREATININE-BSD FRML MDRD: 74 ML/MIN/1.73SQ M
GLUCOSE P FAST SERPL-MCNC: 96 MG/DL (ref 65–99)
HCT VFR BLD AUTO: 40.2 % (ref 34.8–46.1)
HDLC SERPL-MCNC: 85 MG/DL
HGB BLD-MCNC: 13.2 G/DL (ref 11.5–15.4)
IMM GRANULOCYTES # BLD AUTO: 0.01 THOUSAND/UL (ref 0–0.2)
IMM GRANULOCYTES NFR BLD AUTO: 0 % (ref 0–2)
LDLC SERPL CALC-MCNC: 142 MG/DL (ref 0–100)
LDLC SERPL DIRECT ASSAY-MCNC: 139 MG/DL (ref 0–100)
LYMPHOCYTES # BLD AUTO: 1.3 THOUSANDS/ΜL (ref 0.6–4.47)
LYMPHOCYTES NFR BLD AUTO: 31 % (ref 14–44)
MCH RBC QN AUTO: 31.8 PG (ref 26.8–34.3)
MCHC RBC AUTO-ENTMCNC: 32.8 G/DL (ref 31.4–37.4)
MCV RBC AUTO: 97 FL (ref 82–98)
MONOCYTES # BLD AUTO: 0.36 THOUSAND/ΜL (ref 0.17–1.22)
MONOCYTES NFR BLD AUTO: 9 % (ref 4–12)
NEUTROPHILS # BLD AUTO: 2.39 THOUSANDS/ΜL (ref 1.85–7.62)
NEUTS SEG NFR BLD AUTO: 57 % (ref 43–75)
NONHDLC SERPL-MCNC: 158 MG/DL
NRBC BLD AUTO-RTO: 0 /100 WBCS
PLATELET # BLD AUTO: 268 THOUSANDS/UL (ref 149–390)
PMV BLD AUTO: 10.7 FL (ref 8.9–12.7)
POTASSIUM SERPL-SCNC: 4.1 MMOL/L (ref 3.5–5.3)
PROT SERPL-MCNC: 7 G/DL (ref 6.4–8.2)
RBC # BLD AUTO: 4.15 MILLION/UL (ref 3.81–5.12)
SODIUM SERPL-SCNC: 141 MMOL/L (ref 136–145)
TRIGL SERPL-MCNC: 79 MG/DL
TSH SERPL DL<=0.05 MIU/L-ACNC: 2.15 UIU/ML (ref 0.36–3.74)
WBC # BLD AUTO: 4.18 THOUSAND/UL (ref 4.31–10.16)

## 2020-08-11 PROCEDURE — 84443 ASSAY THYROID STIM HORMONE: CPT

## 2020-08-11 PROCEDURE — 85025 COMPLETE CBC W/AUTO DIFF WBC: CPT

## 2020-08-11 PROCEDURE — 80053 COMPREHEN METABOLIC PANEL: CPT

## 2020-08-11 PROCEDURE — 83721 ASSAY OF BLOOD LIPOPROTEIN: CPT

## 2020-08-11 PROCEDURE — 82172 ASSAY OF APOLIPOPROTEIN: CPT

## 2020-08-11 PROCEDURE — 36415 COLL VENOUS BLD VENIPUNCTURE: CPT

## 2020-08-11 PROCEDURE — 80061 LIPID PANEL: CPT

## 2020-08-12 LAB — APO B SERPL-MCNC: 105 MG/DL

## 2020-09-24 ENCOUNTER — TELEPHONE (OUTPATIENT)
Dept: UROLOGY | Facility: AMBULATORY SURGERY CENTER | Age: 70
End: 2020-09-24

## 2020-09-24 NOTE — TELEPHONE ENCOUNTER
Called patient due to XR of KUB not completed left phone number for patient if she may have this done prior to Monday appointment  As well as if she cannot if she may reschedule for another date that it is completed  Asked that patient give our office a call when she receives the voicemail  Covid screening must be completed prior to appointment if she wishes to keep appointment  Inadequate oral intake

## 2020-10-08 ENCOUNTER — OFFICE VISIT (OUTPATIENT)
Dept: CARDIOLOGY CLINIC | Facility: CLINIC | Age: 70
End: 2020-10-08
Payer: COMMERCIAL

## 2020-10-08 ENCOUNTER — TELEPHONE (OUTPATIENT)
Dept: CARDIOLOGY CLINIC | Facility: CLINIC | Age: 70
End: 2020-10-08

## 2020-10-08 VITALS
OXYGEN SATURATION: 98 % | HEART RATE: 70 BPM | TEMPERATURE: 97.8 F | WEIGHT: 105.6 LBS | SYSTOLIC BLOOD PRESSURE: 100 MMHG | DIASTOLIC BLOOD PRESSURE: 50 MMHG | HEIGHT: 65 IN | BODY MASS INDEX: 17.59 KG/M2

## 2020-10-08 DIAGNOSIS — I45.9 HEART BLOCK: Primary | ICD-10-CM

## 2020-10-08 PROCEDURE — 99214 OFFICE O/P EST MOD 30 MIN: CPT | Performed by: NURSE PRACTITIONER

## 2020-10-08 PROCEDURE — 0296T PR EXT ECG > 48HR TO 21 DAY RCRD W/CONECT INTL RCRD: CPT | Performed by: NURSE PRACTITIONER

## 2020-10-08 PROCEDURE — 93000 ELECTROCARDIOGRAM COMPLETE: CPT | Performed by: NURSE PRACTITIONER

## 2020-10-08 PROCEDURE — 1160F RVW MEDS BY RX/DR IN RCRD: CPT | Performed by: NURSE PRACTITIONER

## 2020-10-16 ENCOUNTER — CONSULT (OUTPATIENT)
Dept: CARDIOLOGY CLINIC | Facility: CLINIC | Age: 70
End: 2020-10-16
Payer: COMMERCIAL

## 2020-10-16 VITALS — WEIGHT: 105 LBS | HEIGHT: 65 IN | BODY MASS INDEX: 17.49 KG/M2

## 2020-10-16 DIAGNOSIS — I45.9 HEART BLOCK: ICD-10-CM

## 2020-10-16 PROCEDURE — 1036F TOBACCO NON-USER: CPT | Performed by: INTERNAL MEDICINE

## 2020-10-16 PROCEDURE — 99205 OFFICE O/P NEW HI 60 MIN: CPT | Performed by: INTERNAL MEDICINE

## 2020-10-16 PROCEDURE — 1160F RVW MEDS BY RX/DR IN RCRD: CPT | Performed by: INTERNAL MEDICINE

## 2020-10-20 ENCOUNTER — TELEPHONE (OUTPATIENT)
Dept: DERMATOLOGY | Facility: CLINIC | Age: 70
End: 2020-10-20

## 2020-11-05 ENCOUNTER — CLINICAL SUPPORT (OUTPATIENT)
Dept: CARDIOLOGY CLINIC | Facility: CLINIC | Age: 70
End: 2020-11-05
Payer: COMMERCIAL

## 2020-11-05 DIAGNOSIS — I45.9 HEART BLOCK: ICD-10-CM

## 2020-11-05 PROCEDURE — 0298T PR EXT ECG > 48HR TO 21 DAY REVIEW AND INTERPRETATN: CPT | Performed by: INTERNAL MEDICINE

## 2020-11-06 ENCOUNTER — TELEPHONE (OUTPATIENT)
Dept: CARDIOLOGY CLINIC | Facility: CLINIC | Age: 70
End: 2020-11-06

## 2020-11-08 ENCOUNTER — HOSPITAL ENCOUNTER (INPATIENT)
Facility: HOSPITAL | Age: 70
LOS: 2 days | Discharge: HOME | DRG: 244 | End: 2020-11-10
Attending: EMERGENCY MEDICINE | Admitting: INTERNAL MEDICINE
Payer: COMMERCIAL

## 2020-11-08 ENCOUNTER — APPOINTMENT (EMERGENCY)
Dept: RADIOLOGY | Facility: HOSPITAL | Age: 70
DRG: 244 | End: 2020-11-08
Payer: COMMERCIAL

## 2020-11-08 DIAGNOSIS — I44.2 COMPLETE HEART BLOCK (HCC): ICD-10-CM

## 2020-11-08 DIAGNOSIS — R00.1 BRADYCARDIA: Primary | ICD-10-CM

## 2020-11-08 DIAGNOSIS — R51.9 HEADACHE: ICD-10-CM

## 2020-11-08 DIAGNOSIS — R42 LIGHTHEADEDNESS: ICD-10-CM

## 2020-11-08 LAB
ALBUMIN SERPL BCP-MCNC: 3.7 G/DL (ref 3.5–5)
ALP SERPL-CCNC: 65 U/L (ref 46–116)
ALT SERPL W P-5'-P-CCNC: 26 U/L (ref 12–78)
ANION GAP SERPL CALCULATED.3IONS-SCNC: 4 MMOL/L (ref 4–13)
AST SERPL W P-5'-P-CCNC: 18 U/L (ref 5–45)
BASOPHILS # BLD AUTO: 0.05 THOUSANDS/ΜL (ref 0–0.1)
BASOPHILS NFR BLD AUTO: 1 % (ref 0–1)
BILIRUB SERPL-MCNC: 0.68 MG/DL (ref 0.2–1)
BUN SERPL-MCNC: 13 MG/DL (ref 5–25)
CALCIUM SERPL-MCNC: 9.3 MG/DL (ref 8.3–10.1)
CHLORIDE SERPL-SCNC: 110 MMOL/L (ref 100–108)
CO2 SERPL-SCNC: 29 MMOL/L (ref 21–32)
CREAT SERPL-MCNC: 0.8 MG/DL (ref 0.6–1.3)
EOSINOPHIL # BLD AUTO: 0.08 THOUSAND/ΜL (ref 0–0.61)
EOSINOPHIL NFR BLD AUTO: 1 % (ref 0–6)
ERYTHROCYTE [DISTWIDTH] IN BLOOD BY AUTOMATED COUNT: 13.2 % (ref 11.6–15.1)
GFR SERPL CREATININE-BSD FRML MDRD: 75 ML/MIN/1.73SQ M
GLUCOSE SERPL-MCNC: 113 MG/DL (ref 65–140)
HCT VFR BLD AUTO: 39 % (ref 34.8–46.1)
HGB BLD-MCNC: 13 G/DL (ref 11.5–15.4)
IMM GRANULOCYTES # BLD AUTO: 0.01 THOUSAND/UL (ref 0–0.2)
IMM GRANULOCYTES NFR BLD AUTO: 0 % (ref 0–2)
LYMPHOCYTES # BLD AUTO: 1.44 THOUSANDS/ΜL (ref 0.6–4.47)
LYMPHOCYTES NFR BLD AUTO: 26 % (ref 14–44)
MCH RBC QN AUTO: 31.9 PG (ref 26.8–34.3)
MCHC RBC AUTO-ENTMCNC: 33.3 G/DL (ref 31.4–37.4)
MCV RBC AUTO: 96 FL (ref 82–98)
MONOCYTES # BLD AUTO: 0.32 THOUSAND/ΜL (ref 0.17–1.22)
MONOCYTES NFR BLD AUTO: 6 % (ref 4–12)
NEUTROPHILS # BLD AUTO: 3.64 THOUSANDS/ΜL (ref 1.85–7.62)
NEUTS SEG NFR BLD AUTO: 66 % (ref 43–75)
NRBC BLD AUTO-RTO: 0 /100 WBCS
PLATELET # BLD AUTO: 238 THOUSANDS/UL (ref 149–390)
PMV BLD AUTO: 9.5 FL (ref 8.9–12.7)
POTASSIUM SERPL-SCNC: 3.8 MMOL/L (ref 3.5–5.3)
PROT SERPL-MCNC: 6.9 G/DL (ref 6.4–8.2)
RBC # BLD AUTO: 4.08 MILLION/UL (ref 3.81–5.12)
SODIUM SERPL-SCNC: 143 MMOL/L (ref 136–145)
TROPONIN I SERPL-MCNC: <0.02 NG/ML
WBC # BLD AUTO: 5.54 THOUSAND/UL (ref 4.31–10.16)

## 2020-11-08 PROCEDURE — 84484 ASSAY OF TROPONIN QUANT: CPT | Performed by: EMERGENCY MEDICINE

## 2020-11-08 PROCEDURE — 93005 ELECTROCARDIOGRAM TRACING: CPT

## 2020-11-08 PROCEDURE — 71045 X-RAY EXAM CHEST 1 VIEW: CPT

## 2020-11-08 PROCEDURE — 99285 EMERGENCY DEPT VISIT HI MDM: CPT

## 2020-11-08 PROCEDURE — 85025 COMPLETE CBC W/AUTO DIFF WBC: CPT | Performed by: EMERGENCY MEDICINE

## 2020-11-08 PROCEDURE — 80053 COMPREHEN METABOLIC PANEL: CPT | Performed by: EMERGENCY MEDICINE

## 2020-11-08 PROCEDURE — 99285 EMERGENCY DEPT VISIT HI MDM: CPT | Performed by: EMERGENCY MEDICINE

## 2020-11-08 PROCEDURE — 36415 COLL VENOUS BLD VENIPUNCTURE: CPT

## 2020-11-08 PROCEDURE — 99223 1ST HOSP IP/OBS HIGH 75: CPT | Performed by: INTERNAL MEDICINE

## 2020-11-08 RX ORDER — ASPIRIN 81 MG/1
81 TABLET ORAL
Status: DISCONTINUED | OUTPATIENT
Start: 2020-11-09 | End: 2020-11-10 | Stop reason: HOSPADM

## 2020-11-08 RX ORDER — MELATONIN
2000 DAILY
Status: DISCONTINUED | OUTPATIENT
Start: 2020-11-09 | End: 2020-11-10 | Stop reason: HOSPADM

## 2020-11-08 RX ORDER — LANOLIN ALCOHOL/MO/W.PET/CERES
3 CREAM (GRAM) TOPICAL
Status: DISCONTINUED | OUTPATIENT
Start: 2020-11-08 | End: 2020-11-10 | Stop reason: HOSPADM

## 2020-11-08 RX ORDER — SERTRALINE HYDROCHLORIDE 25 MG/1
25 TABLET, FILM COATED ORAL
Status: DISCONTINUED | OUTPATIENT
Start: 2020-11-09 | End: 2020-11-10 | Stop reason: HOSPADM

## 2020-11-08 RX ORDER — CHLORAL HYDRATE 500 MG
1000 CAPSULE ORAL DAILY
Status: DISCONTINUED | OUTPATIENT
Start: 2020-11-09 | End: 2020-11-10 | Stop reason: HOSPADM

## 2020-11-09 ENCOUNTER — ANESTHESIA EVENT (INPATIENT)
Dept: NON INVASIVE DIAGNOSTICS | Facility: HOSPITAL | Age: 70
DRG: 244 | End: 2020-11-09
Payer: COMMERCIAL

## 2020-11-09 ENCOUNTER — APPOINTMENT (INPATIENT)
Dept: NON INVASIVE DIAGNOSTICS | Facility: HOSPITAL | Age: 70
DRG: 244 | End: 2020-11-09
Attending: INTERNAL MEDICINE
Payer: COMMERCIAL

## 2020-11-09 ENCOUNTER — APPOINTMENT (INPATIENT)
Dept: RADIOLOGY | Facility: HOSPITAL | Age: 70
DRG: 244 | End: 2020-11-09
Payer: COMMERCIAL

## 2020-11-09 VITALS — HEART RATE: 60 BPM

## 2020-11-09 LAB
ANION GAP SERPL CALCULATED.3IONS-SCNC: 4 MMOL/L (ref 4–13)
ATRIAL RATE: 54 BPM
ATRIAL RATE: 59 BPM
BUN SERPL-MCNC: 17 MG/DL (ref 5–25)
CALCIUM SERPL-MCNC: 9.2 MG/DL (ref 8.3–10.1)
CHLORIDE SERPL-SCNC: 110 MMOL/L (ref 100–108)
CO2 SERPL-SCNC: 28 MMOL/L (ref 21–32)
CREAT SERPL-MCNC: 0.77 MG/DL (ref 0.6–1.3)
GFR SERPL CREATININE-BSD FRML MDRD: 79 ML/MIN/1.73SQ M
GLUCOSE SERPL-MCNC: 92 MG/DL (ref 65–140)
MAGNESIUM SERPL-MCNC: 2.3 MG/DL (ref 1.6–2.6)
P AXIS: -66 DEGREES
P AXIS: 85 DEGREES
POTASSIUM SERPL-SCNC: 3.9 MMOL/L (ref 3.5–5.3)
PR INTERVAL: 216 MS
PR INTERVAL: 244 MS
QRS AXIS: -62 DEGREES
QRS AXIS: 86 DEGREES
QRSD INTERVAL: 120 MS
QRSD INTERVAL: 76 MS
QT INTERVAL: 434 MS
QT INTERVAL: 436 MS
QTC INTERVAL: 413 MS
QTC INTERVAL: 429 MS
SODIUM SERPL-SCNC: 142 MMOL/L (ref 136–145)
T WAVE AXIS: 85 DEGREES
T WAVE AXIS: 96 DEGREES
VENTRICULAR RATE: 54 BPM
VENTRICULAR RATE: 59 BPM

## 2020-11-09 PROCEDURE — 93306 TTE W/DOPPLER COMPLETE: CPT | Performed by: INTERNAL MEDICINE

## 2020-11-09 PROCEDURE — 33208 INSRT HEART PM ATRIAL & VENT: CPT | Performed by: INTERNAL MEDICINE

## 2020-11-09 PROCEDURE — C1769 GUIDE WIRE: HCPCS | Performed by: INTERNAL MEDICINE

## 2020-11-09 PROCEDURE — 36415 COLL VENOUS BLD VENIPUNCTURE: CPT | Performed by: INTERNAL MEDICINE

## 2020-11-09 PROCEDURE — 83735 ASSAY OF MAGNESIUM: CPT | Performed by: INTERNAL MEDICINE

## 2020-11-09 PROCEDURE — C1892 INTRO/SHEATH,FIXED,PEEL-AWAY: HCPCS | Performed by: INTERNAL MEDICINE

## 2020-11-09 PROCEDURE — C1898 LEAD, PMKR, OTHER THAN TRANS: HCPCS

## 2020-11-09 PROCEDURE — 80048 BASIC METABOLIC PNL TOTAL CA: CPT | Performed by: INTERNAL MEDICINE

## 2020-11-09 PROCEDURE — 93005 ELECTROCARDIOGRAM TRACING: CPT

## 2020-11-09 PROCEDURE — C1785 PMKR, DUAL, RATE-RESP: HCPCS

## 2020-11-09 PROCEDURE — 99223 1ST HOSP IP/OBS HIGH 75: CPT | Performed by: INTERNAL MEDICINE

## 2020-11-09 PROCEDURE — 93010 ELECTROCARDIOGRAM REPORT: CPT | Performed by: INTERNAL MEDICINE

## 2020-11-09 PROCEDURE — 02H63JZ INSERTION OF PACEMAKER LEAD INTO RIGHT ATRIUM, PERCUTANEOUS APPROACH: ICD-10-PCS | Performed by: INTERNAL MEDICINE

## 2020-11-09 PROCEDURE — 02HK3JZ INSERTION OF PACEMAKER LEAD INTO RIGHT VENTRICLE, PERCUTANEOUS APPROACH: ICD-10-PCS | Performed by: INTERNAL MEDICINE

## 2020-11-09 PROCEDURE — 99233 SBSQ HOSP IP/OBS HIGH 50: CPT | Performed by: INTERNAL MEDICINE

## 2020-11-09 PROCEDURE — 93306 TTE W/DOPPLER COMPLETE: CPT

## 2020-11-09 PROCEDURE — 0JH606Z INSERTION OF PACEMAKER, DUAL CHAMBER INTO CHEST SUBCUTANEOUS TISSUE AND FASCIA, OPEN APPROACH: ICD-10-PCS | Performed by: INTERNAL MEDICINE

## 2020-11-09 PROCEDURE — 71045 X-RAY EXAM CHEST 1 VIEW: CPT

## 2020-11-09 RX ORDER — GENTAMICIN SULFATE 40 MG/ML
INJECTION, SOLUTION INTRAMUSCULAR; INTRAVENOUS CODE/TRAUMA/SEDATION MEDICATION
Status: COMPLETED | OUTPATIENT
Start: 2020-11-09 | End: 2020-11-09

## 2020-11-09 RX ORDER — PROPOFOL 10 MG/ML
INJECTION, EMULSION INTRAVENOUS AS NEEDED
Status: DISCONTINUED | OUTPATIENT
Start: 2020-11-09 | End: 2020-11-09

## 2020-11-09 RX ORDER — ASPIRIN 81 MG/1
TABLET, CHEWABLE ORAL
Status: DISCONTINUED
Start: 2020-11-09 | End: 2020-11-09 | Stop reason: WASHOUT

## 2020-11-09 RX ORDER — OXYCODONE HYDROCHLORIDE 5 MG/1
2.5 TABLET ORAL EVERY 6 HOURS PRN
Status: DISCONTINUED | OUTPATIENT
Start: 2020-11-09 | End: 2020-11-10

## 2020-11-09 RX ORDER — LIDOCAINE HYDROCHLORIDE 10 MG/ML
INJECTION, SOLUTION EPIDURAL; INFILTRATION; INTRACAUDAL; PERINEURAL CODE/TRAUMA/SEDATION MEDICATION
Status: COMPLETED | OUTPATIENT
Start: 2020-11-09 | End: 2020-11-09

## 2020-11-09 RX ORDER — CHLORHEXIDINE GLUCONATE 0.12 MG/ML
15 RINSE ORAL ONCE
Status: DISCONTINUED | OUTPATIENT
Start: 2020-11-09 | End: 2020-11-10 | Stop reason: HOSPADM

## 2020-11-09 RX ORDER — PROPOFOL 10 MG/ML
INJECTION, EMULSION INTRAVENOUS CONTINUOUS PRN
Status: DISCONTINUED | OUTPATIENT
Start: 2020-11-09 | End: 2020-11-09

## 2020-11-09 RX ORDER — SODIUM CHLORIDE 9 MG/ML
INJECTION, SOLUTION INTRAVENOUS CONTINUOUS PRN
Status: DISCONTINUED | OUTPATIENT
Start: 2020-11-09 | End: 2020-11-09

## 2020-11-09 RX ORDER — OXYCODONE HYDROCHLORIDE 5 MG/1
5 TABLET ORAL EVERY 6 HOURS PRN
Status: DISCONTINUED | OUTPATIENT
Start: 2020-11-09 | End: 2020-11-10

## 2020-11-09 RX ORDER — ACETAMINOPHEN 325 MG/1
650 TABLET ORAL EVERY 6 HOURS PRN
Status: DISCONTINUED | OUTPATIENT
Start: 2020-11-09 | End: 2020-11-10

## 2020-11-09 RX ORDER — LIDOCAINE HYDROCHLORIDE 10 MG/ML
INJECTION, SOLUTION EPIDURAL; INFILTRATION; INTRACAUDAL; PERINEURAL AS NEEDED
Status: DISCONTINUED | OUTPATIENT
Start: 2020-11-09 | End: 2020-11-09

## 2020-11-09 RX ADMIN — PROPOFOL 100 MCG/KG/MIN: 10 INJECTION, EMULSION INTRAVENOUS at 12:08

## 2020-11-09 RX ADMIN — GENTAMICIN SULFATE 80 MG: 40 INJECTION, SOLUTION INTRAMUSCULAR; INTRAVENOUS at 12:35

## 2020-11-09 RX ADMIN — PHENYLEPHRINE HYDROCHLORIDE 200 MCG: 10 INJECTION INTRAVENOUS at 12:19

## 2020-11-09 RX ADMIN — OMEGA-3 FATTY ACIDS CAP 1000 MG 1000 MG: 1000 CAP at 17:39

## 2020-11-09 RX ADMIN — VANCOMYCIN HYDROCHLORIDE 750 MG: 750 INJECTION, SOLUTION INTRAVENOUS at 11:32

## 2020-11-09 RX ADMIN — SERTRALINE HYDROCHLORIDE 25 MG: 25 TABLET ORAL at 05:41

## 2020-11-09 RX ADMIN — LIDOCAINE HYDROCHLORIDE 30 MG: 10 INJECTION, SOLUTION EPIDURAL; INFILTRATION; INTRACAUDAL; PERINEURAL at 11:49

## 2020-11-09 RX ADMIN — Medication 2000 UNITS: at 17:39

## 2020-11-09 RX ADMIN — SODIUM CHLORIDE: 0.9 INJECTION, SOLUTION INTRAVENOUS at 11:40

## 2020-11-09 RX ADMIN — ASPIRIN 81 MG: 81 TABLET ORAL at 17:39

## 2020-11-09 RX ADMIN — PHENYLEPHRINE HYDROCHLORIDE 200 MCG: 10 INJECTION INTRAVENOUS at 12:32

## 2020-11-09 RX ADMIN — IOHEXOL 10 ML: 350 INJECTION, SOLUTION INTRAVENOUS at 12:13

## 2020-11-09 RX ADMIN — PROPOFOL 50 MG: 10 INJECTION, EMULSION INTRAVENOUS at 12:08

## 2020-11-09 RX ADMIN — ACETAMINOPHEN 325 MG: 325 TABLET, FILM COATED ORAL at 23:17

## 2020-11-09 RX ADMIN — LIDOCAINE HYDROCHLORIDE 100 MG: 10 INJECTION, SOLUTION EPIDURAL; INFILTRATION; INTRACAUDAL; PERINEURAL at 12:53

## 2020-11-09 RX ADMIN — PHENYLEPHRINE HYDROCHLORIDE 200 MCG: 10 INJECTION INTRAVENOUS at 12:50

## 2020-11-09 RX ADMIN — PROPOFOL 50 MG: 10 INJECTION, EMULSION INTRAVENOUS at 11:49

## 2020-11-09 RX ADMIN — LIDOCAINE HYDROCHLORIDE 30 ML: 10 INJECTION, SOLUTION EPIDURAL; INFILTRATION; INTRACAUDAL; PERINEURAL at 12:09

## 2020-11-10 ENCOUNTER — APPOINTMENT (INPATIENT)
Dept: RADIOLOGY | Facility: HOSPITAL | Age: 70
DRG: 244 | End: 2020-11-10
Payer: COMMERCIAL

## 2020-11-10 VITALS
HEART RATE: 63 BPM | SYSTOLIC BLOOD PRESSURE: 123 MMHG | BODY MASS INDEX: 17.74 KG/M2 | OXYGEN SATURATION: 96 % | RESPIRATION RATE: 18 BRPM | DIASTOLIC BLOOD PRESSURE: 66 MMHG | WEIGHT: 105 LBS | TEMPERATURE: 98 F

## 2020-11-10 LAB
ANION GAP SERPL CALCULATED.3IONS-SCNC: 4 MMOL/L (ref 4–13)
BUN SERPL-MCNC: 16 MG/DL (ref 5–25)
CALCIUM SERPL-MCNC: 8.7 MG/DL (ref 8.3–10.1)
CHLORIDE SERPL-SCNC: 109 MMOL/L (ref 100–108)
CO2 SERPL-SCNC: 27 MMOL/L (ref 21–32)
CREAT SERPL-MCNC: 0.7 MG/DL (ref 0.6–1.3)
ERYTHROCYTE [DISTWIDTH] IN BLOOD BY AUTOMATED COUNT: 13.1 % (ref 11.6–15.1)
GFR SERPL CREATININE-BSD FRML MDRD: 89 ML/MIN/1.73SQ M
GLUCOSE SERPL-MCNC: 96 MG/DL (ref 65–140)
HCT VFR BLD AUTO: 39.2 % (ref 34.8–46.1)
HGB BLD-MCNC: 12.9 G/DL (ref 11.5–15.4)
MAGNESIUM SERPL-MCNC: 2.3 MG/DL (ref 1.6–2.6)
MCH RBC QN AUTO: 31.5 PG (ref 26.8–34.3)
MCHC RBC AUTO-ENTMCNC: 32.9 G/DL (ref 31.4–37.4)
MCV RBC AUTO: 96 FL (ref 82–98)
PLATELET # BLD AUTO: 215 THOUSANDS/UL (ref 149–390)
PMV BLD AUTO: 9.8 FL (ref 8.9–12.7)
POTASSIUM SERPL-SCNC: 3.8 MMOL/L (ref 3.5–5.3)
RBC # BLD AUTO: 4.09 MILLION/UL (ref 3.81–5.12)
SODIUM SERPL-SCNC: 140 MMOL/L (ref 136–145)
WBC # BLD AUTO: 4.46 THOUSAND/UL (ref 4.31–10.16)

## 2020-11-10 PROCEDURE — 83735 ASSAY OF MAGNESIUM: CPT | Performed by: INTERNAL MEDICINE

## 2020-11-10 PROCEDURE — 85027 COMPLETE CBC AUTOMATED: CPT | Performed by: INTERNAL MEDICINE

## 2020-11-10 PROCEDURE — 71046 X-RAY EXAM CHEST 2 VIEWS: CPT

## 2020-11-10 PROCEDURE — 99239 HOSP IP/OBS DSCHRG MGMT >30: CPT | Performed by: INTERNAL MEDICINE

## 2020-11-10 PROCEDURE — 99024 POSTOP FOLLOW-UP VISIT: CPT | Performed by: INTERNAL MEDICINE

## 2020-11-10 PROCEDURE — 80048 BASIC METABOLIC PNL TOTAL CA: CPT | Performed by: INTERNAL MEDICINE

## 2020-11-10 RX ORDER — IBUPROFEN 200 MG
400 TABLET ORAL EVERY 6 HOURS PRN
Start: 2020-11-10 | End: 2020-11-20 | Stop reason: ALTCHOICE

## 2020-11-10 RX ORDER — IBUPROFEN 400 MG/1
400 TABLET ORAL EVERY 6 HOURS PRN
Status: DISCONTINUED | OUTPATIENT
Start: 2020-11-10 | End: 2020-11-10 | Stop reason: HOSPADM

## 2020-11-10 RX ADMIN — SERTRALINE HYDROCHLORIDE 25 MG: 25 TABLET ORAL at 05:56

## 2020-11-10 RX ADMIN — ASPIRIN 81 MG: 81 TABLET ORAL at 05:52

## 2020-11-10 RX ADMIN — Medication 2000 UNITS: at 09:07

## 2020-11-10 RX ADMIN — IBUPROFEN 400 MG: 400 TABLET ORAL at 05:53

## 2020-11-11 ENCOUNTER — TRANSITIONAL CARE MANAGEMENT (OUTPATIENT)
Dept: FAMILY MEDICINE CLINIC | Facility: CLINIC | Age: 70
End: 2020-11-11

## 2020-11-11 ENCOUNTER — TELEPHONE (OUTPATIENT)
Dept: FAMILY MEDICINE CLINIC | Facility: CLINIC | Age: 70
End: 2020-11-11

## 2020-11-12 ENCOUNTER — TELEPHONE (OUTPATIENT)
Dept: CARDIOLOGY CLINIC | Facility: CLINIC | Age: 70
End: 2020-11-12

## 2020-11-20 ENCOUNTER — TELEMEDICINE (OUTPATIENT)
Dept: CARDIOLOGY CLINIC | Facility: CLINIC | Age: 70
End: 2020-11-20

## 2020-11-20 VITALS — HEIGHT: 65 IN | BODY MASS INDEX: 17.66 KG/M2 | WEIGHT: 106 LBS

## 2020-11-20 DIAGNOSIS — I45.9 HEART BLOCK: Primary | ICD-10-CM

## 2020-11-20 PROCEDURE — 99024 POSTOP FOLLOW-UP VISIT: CPT | Performed by: INTERNAL MEDICINE

## 2020-11-20 PROCEDURE — 3008F BODY MASS INDEX DOCD: CPT | Performed by: INTERNAL MEDICINE

## 2021-09-29 ENCOUNTER — APPOINTMENT (RX ONLY)
Dept: URBAN - METROPOLITAN AREA CLINIC 349 | Facility: CLINIC | Age: 71
Setting detail: DERMATOLOGY
End: 2021-09-29

## 2021-09-29 DIAGNOSIS — L98.8 OTHER SPECIFIED DISORDERS OF THE SKIN AND SUBCUTANEOUS TISSUE: ICD-10-CM

## 2021-09-29 PROCEDURE — ? RESTYLANE KYSSE INJECTION

## 2021-09-29 NOTE — PROCEDURE: RESTYLANE KYSSE INJECTION
Brows Filler  Volume In Cc: 0
Map Statement: See Attach Map for Complete Details
Vermilion Lips Filler Volume In Cc: 0.6
Post-Care Instructions: Patient instructed to apply ice to reduce swelling.
Anesthesia Volume In Cc: 1
Consent: Written consent obtained. Risks include but not limited to bruising, beading, irregular texture, ulceration, infection, allergic reaction, scar formation, incomplete augmentation, temporary nature, procedural pain.
Anesthesia Type: 1% lidocaine with epinephrine
Procedural Text: The filler was administered to the treatment areas noted above.
Show Inventory Tab: Show
Detail Level: Detailed
Filler: Restylane Kysse
Additional Area 1 Location: chin
Additional Area 1 Volume In Cc: 0.4
Additional Anesthesia Volume In Cc: 6
Use Map Statement For Sites (Optional): No

## (undated) DEVICE — ENDOSCOPIC VALVE WITH ADAPTER.: Brand: SURSEAL® II

## (undated) DEVICE — BASKET SPECIMEN RETRIVAL 1.9FR 120CM

## (undated) DEVICE — GLOVE INDICATOR PI UNDERGLOVE SZ 8 BLUE

## (undated) DEVICE — SCD SEQUENTIAL COMPRESSION COMFORT SLEEVE MEDIUM KNEE LENGTH: Brand: KENDALL SCD

## (undated) DEVICE — LASER HOLMIUM FIBER 365 MIC

## (undated) DEVICE — STERILE SURGICAL LUBRICANT,  TUBE: Brand: SURGILUBE

## (undated) DEVICE — PACK TUR

## (undated) DEVICE — GLOVE SRG BIOGEL ORTHOPEDIC 7.5

## (undated) DEVICE — SPECIMEN CONTAINER STERILE PEEL PACK

## (undated) DEVICE — UROCATCH BAG

## (undated) DEVICE — Device

## (undated) DEVICE — CHLORHEXIDINE 4PCT 4 OZ

## (undated) DEVICE — 3000CC GUARDIAN II: Brand: GUARDIAN

## (undated) DEVICE — TUBING SUCTION 5MM X 12 FT

## (undated) DEVICE — GUIDEWIRE STRGHT TIP 0.035 IN  SOLO PLUS

## (undated) DEVICE — CATH URET .038 10FR 50CM DUAL LUMEN

## (undated) DEVICE — CATH URETERAL 5FR X 70 CM FLEX TIP POLYUR BARD

## (undated) DEVICE — SHEATH URETERAL ACCESS 10/12FR 35CM PROXIS

## (undated) DEVICE — LASER FIBER HOLMIUM 272MICRON

## (undated) DEVICE — SYRINGE 10ML LL

## (undated) DEVICE — GLOVE SRG BIOGEL 7.5

## (undated) DEVICE — 3M™ TEGADERM™ TRANSPARENT FILM DRESSING FRAME STYLE, 1624W, 2-3/8 IN X 2-3/4 IN (6 CM X 7 CM), 100/CT 4CT/CASE: Brand: 3M™ TEGADERM™

## (undated) DEVICE — PREMIUM DRY TRAY LF: Brand: MEDLINE INDUSTRIES, INC.